# Patient Record
Sex: FEMALE | Race: BLACK OR AFRICAN AMERICAN | Employment: UNEMPLOYED | URBAN - METROPOLITAN AREA
[De-identification: names, ages, dates, MRNs, and addresses within clinical notes are randomized per-mention and may not be internally consistent; named-entity substitution may affect disease eponyms.]

---

## 2020-02-03 ENCOUNTER — HOSPITAL ENCOUNTER (EMERGENCY)
Facility: HOSPITAL | Age: 30
Discharge: HOME | End: 2020-02-03
Payer: MEDICAID

## 2020-02-03 VITALS
DIASTOLIC BLOOD PRESSURE: 88 MMHG | BODY MASS INDEX: 35.84 KG/M2 | TEMPERATURE: 100 F | HEIGHT: 66 IN | WEIGHT: 223 LBS | RESPIRATION RATE: 18 BRPM | OXYGEN SATURATION: 97 % | HEART RATE: 109 BPM | SYSTOLIC BLOOD PRESSURE: 133 MMHG

## 2020-02-03 DIAGNOSIS — J11.1 INFLUENZA: Primary | ICD-10-CM

## 2020-02-03 LAB
FLUAV + FLUBV RNA SPEC NAA+PROBE: NEGATIVE
FLUAV + FLUBV RNA SPEC NAA+PROBE: NEGATIVE
FLUAV + FLUBV RNA SPEC NAA+PROBE: POSITIVE
S PYO AG THROAT QL: NEGATIVE

## 2020-02-03 PROCEDURE — 99283 EMERGENCY DEPT VISIT LOW MDM: CPT

## 2020-02-03 PROCEDURE — 87430 STREP A AG IA: CPT

## 2020-02-03 PROCEDURE — 87631 RESP VIRUS 3-5 TARGETS: CPT

## 2020-02-03 RX ORDER — OSELTAMIVIR PHOSPHATE 75 MG/1
75 CAPSULE ORAL 2 TIMES DAILY
Qty: 10 CAPSULE | Refills: 0 | Status: SHIPPED | OUTPATIENT
Start: 2020-02-03 | End: 2020-02-03 | Stop reason: CLARIF

## 2020-02-03 RX ORDER — OSELTAMIVIR PHOSPHATE 75 MG/1
75 CAPSULE ORAL 2 TIMES DAILY
Qty: 10 CAPSULE | Refills: 0 | Status: SHIPPED | OUTPATIENT
Start: 2020-02-03 | End: 2020-02-08

## 2020-02-04 NOTE — ED INITIAL ASSESSMENT (HPI)
Pt ambulatory to triage with complaints of cough and congestion, fever, body aches,bilateral  ear pain that started Friday. Pt denies Significant PMH. Pt is A/O x 4, breathing is non labored. Mask applied.

## 2020-02-04 NOTE — ED PROVIDER NOTES
Patient Seen in: [facility] Emergency Department    History   Patient presents with:  Cough/URI    Stated Complaint: flu like symptoms    HPI  19-year-old female patient with no chronic medical history presents to ER accompanied by his family with bilateral  NOSE: nasal turbinates boggy  NECK: supple, no adenopathy, no thyromegaly  THROAT: pnd noted, post phaynx mildly injected, no tonsillar enlargement or exudates bilaterally uvula is midline.   LUNGS: no accessory use, increased upper airway sounds

## 2020-05-22 ENCOUNTER — HOSPITAL ENCOUNTER (EMERGENCY)
Facility: HOSPITAL | Age: 30
Discharge: HOME | End: 2020-05-22
Attending: EMERGENCY MEDICINE
Payer: MEDICAID

## 2020-05-22 VITALS
OXYGEN SATURATION: 98 % | SYSTOLIC BLOOD PRESSURE: 124 MMHG | HEART RATE: 83 BPM | DIASTOLIC BLOOD PRESSURE: 82 MMHG | WEIGHT: 229 LBS | TEMPERATURE: 99 F | HEIGHT: 66 IN | BODY MASS INDEX: 36.8 KG/M2 | RESPIRATION RATE: 20 BRPM

## 2020-05-22 DIAGNOSIS — Z13.9 ENCOUNTER FOR MEDICAL SCREENING EXAMINATION: Primary | ICD-10-CM

## 2020-05-22 PROCEDURE — 99283 EMERGENCY DEPT VISIT LOW MDM: CPT

## 2020-05-22 NOTE — ED INITIAL ASSESSMENT (HPI)
Pt wants to be tested for COVID. Her boyfriend was tested Positive for COVID yesterday. Pt denies any symptoms of COVID.

## 2020-05-22 NOTE — ED PROVIDER NOTES
Patient Seen in: [facility] Emergency Department      History   Patient presents with: Other    Stated Complaint: Wants to get tested.  BF positive     HPI    63-year-old female with no significant past medical history presents to the emergency de No sig cervical LAD   Neurological: Awake, alert. Normal reflexes. No cranial nerve deficit. Skin: Skin is warm and dry. No rash noted. No erythema.    Psychiatric:    ED Course     Labs Reviewed   RAPID SARS-COV-2 BY PCR - Normal                  MDM

## 2020-05-28 ENCOUNTER — APPOINTMENT (OUTPATIENT)
Dept: GENERAL RADIOLOGY | Facility: HOSPITAL | Age: 30
End: 2020-05-28
Attending: EMERGENCY MEDICINE
Payer: MEDICAID

## 2020-05-28 ENCOUNTER — HOSPITAL ENCOUNTER (EMERGENCY)
Facility: HOSPITAL | Age: 30
Discharge: HOME | End: 2020-05-28
Attending: EMERGENCY MEDICINE
Payer: MEDICAID

## 2020-05-28 VITALS
TEMPERATURE: 100 F | RESPIRATION RATE: 18 BRPM | BODY MASS INDEX: 36.96 KG/M2 | HEIGHT: 66 IN | OXYGEN SATURATION: 97 % | HEART RATE: 90 BPM | SYSTOLIC BLOOD PRESSURE: 116 MMHG | DIASTOLIC BLOOD PRESSURE: 50 MMHG | WEIGHT: 230 LBS

## 2020-05-28 DIAGNOSIS — U07.1 COVID-19 VIRUS INFECTION: Primary | ICD-10-CM

## 2020-05-28 PROCEDURE — 99284 EMERGENCY DEPT VISIT MOD MDM: CPT

## 2020-05-28 PROCEDURE — 71045 X-RAY EXAM CHEST 1 VIEW: CPT | Performed by: EMERGENCY MEDICINE

## 2020-05-28 RX ORDER — ONDANSETRON 4 MG/1
4 TABLET, FILM COATED ORAL ONCE
Status: DISCONTINUED | OUTPATIENT
Start: 2020-05-28 | End: 2020-05-28

## 2020-05-28 RX ORDER — ACETAMINOPHEN 500 MG
1000 TABLET ORAL ONCE
Status: COMPLETED | OUTPATIENT
Start: 2020-05-28 | End: 2020-05-28

## 2020-05-28 RX ORDER — ONDANSETRON 4 MG/1
4 TABLET, ORALLY DISINTEGRATING ORAL ONCE
Status: COMPLETED | OUTPATIENT
Start: 2020-05-28 | End: 2020-05-28

## 2020-05-28 NOTE — ED INITIAL ASSESSMENT (HPI)
Pt came in for body aches, dry cough, diarrhea, and SOB when lying down for 3-4 days. RR even and nonlabored at rest, speaking in full sentences, ambulatory with steady gait.

## 2020-05-28 NOTE — ED PROVIDER NOTES
Patient Seen in: [facility] Emergency Department      History   Patient presents with:  Pain  Dyspnea CLARISSE SOB    Stated Complaint:     HPI    77-year-old female presents the ER with complaint of shortness of breath, body aches, diarrhea for the pa Pulmonary effort is normal.      Breath sounds: Normal breath sounds. Abdominal:      General: Bowel sounds are normal.      Palpations: Abdomen is soft. Musculoskeletal: Normal range of motion. Skin:     General: Skin is warm.       Capillary Refill: patient.

## 2021-02-09 ENCOUNTER — OFFICE VISIT (OUTPATIENT)
Dept: OBGYN CLINIC | Facility: CLINIC | Age: 31
End: 2021-02-09
Payer: MEDICAID

## 2021-02-09 VITALS
DIASTOLIC BLOOD PRESSURE: 72 MMHG | BODY MASS INDEX: 35.68 KG/M2 | HEIGHT: 66 IN | WEIGHT: 222 LBS | SYSTOLIC BLOOD PRESSURE: 107 MMHG | HEART RATE: 91 BPM

## 2021-02-09 DIAGNOSIS — N92.6 MISSED MENSES: Primary | ICD-10-CM

## 2021-02-09 DIAGNOSIS — O09.891 RADIATION EXPOSURE AFFECTING PREGNANCY IN FIRST TRIMESTER: ICD-10-CM

## 2021-02-09 DIAGNOSIS — O99.210 OBESITY IN PREGNANCY: ICD-10-CM

## 2021-02-09 LAB
CONTROL LINE PRESENT WITH A CLEAR BACKGROUND (YES/NO): YES YES/NO
KIT LOT #: NORMAL NUMERIC
PREGNANCY TEST, URINE: POSITIVE

## 2021-02-09 PROCEDURE — 81025 URINE PREGNANCY TEST: CPT | Performed by: ADVANCED PRACTICE MIDWIFE

## 2021-02-09 PROCEDURE — 3074F SYST BP LT 130 MM HG: CPT | Performed by: ADVANCED PRACTICE MIDWIFE

## 2021-02-09 PROCEDURE — 99203 OFFICE O/P NEW LOW 30 MIN: CPT | Performed by: ADVANCED PRACTICE MIDWIFE

## 2021-02-09 PROCEDURE — 3078F DIAST BP <80 MM HG: CPT | Performed by: ADVANCED PRACTICE MIDWIFE

## 2021-02-09 PROCEDURE — 3008F BODY MASS INDEX DOCD: CPT | Performed by: ADVANCED PRACTICE MIDWIFE

## 2021-02-09 RX ORDER — GLYCERIN/MINERAL OIL
LOTION (ML) TOPICAL
COMMUNITY

## 2021-02-09 NOTE — PROGRESS NOTES
Sapna Ruiz is a 27year old female. HPI:   Patient presents with:  Gyn Exam: Missed menses        , hx  x 2. No complications.    Approximate LMP 2021, TANISHA 10/11/2021, now at 5 1/7 wks  Denies pain or bleeding. + nausea, No vomiting stools  Genito-Urinary ROS: no dysuria, trouble voiding, or hematuria  Neurological ROS: negative for - dizziness or headaches      PHYSICAL EXAM:      02/09/21  1655   BP: 107/72   Pulse: 91       Physical Exam   Constitutional: She is oriented to person, pregnancy and may recommend anti-coagulation if covid + in pregnancy. Notify CNM if test positive or any symptoms of Covid-19.   9. Declines flu vaccine today, will consider for next visit.     Pre- Eclampsia Risk Assessment:   High risk Factors- none  Mode

## 2021-02-17 ENCOUNTER — TELEPHONE (OUTPATIENT)
Dept: OBGYN CLINIC | Facility: CLINIC | Age: 31
End: 2021-02-17

## 2021-02-17 NOTE — TELEPHONE ENCOUNTER
Patient is calling about migraines she is having. She is having trouble keeping her eyes open due to the pain. Please advise.

## 2021-02-17 NOTE — TELEPHONE ENCOUNTER
Pt states she has had a migraine for the past 4 days. Pt states she has a hx of migraines before pregnancy and she would use Ibuprofen to help with pain. Pt states she has taken Tylenol and it has not helped.  Pt states yesterday she had blurry vision and s

## 2021-02-18 NOTE — TELEPHONE ENCOUNTER
Pt advised per MES she can take Tylenol extra strength with caffeine and if this does not resolve migraine she can come in for appt tomorrow. Pt asking if there is any additional vitamin she can take for energy.  Advised she will be screened for anemia with

## 2021-02-23 ENCOUNTER — HOSPITAL ENCOUNTER (OUTPATIENT)
Dept: ULTRASOUND IMAGING | Facility: HOSPITAL | Age: 31
Discharge: HOME | End: 2021-02-23
Attending: ADVANCED PRACTICE MIDWIFE
Payer: MEDICAID

## 2021-02-23 ENCOUNTER — TELEPHONE (OUTPATIENT)
Dept: OBGYN CLINIC | Facility: CLINIC | Age: 31
End: 2021-02-23

## 2021-02-23 DIAGNOSIS — N92.6 MISSED MENSES: ICD-10-CM

## 2021-02-23 PROCEDURE — 76802 OB US < 14 WKS ADDL FETUS: CPT | Performed by: ADVANCED PRACTICE MIDWIFE

## 2021-02-23 PROCEDURE — 76817 TRANSVAGINAL US OBSTETRIC: CPT | Performed by: ADVANCED PRACTICE MIDWIFE

## 2021-02-23 PROCEDURE — 76801 OB US < 14 WKS SINGLE FETUS: CPT | Performed by: ADVANCED PRACTICE MIDWIFE

## 2021-02-23 NOTE — TELEPHONE ENCOUNTER
Patient started prenatal care with the midwives. She is expecting twins. They are referring her to the [facility] group and suggested she scheduled a nurse education call. Please advise.

## 2021-02-23 NOTE — TELEPHONE ENCOUNTER
PC to patient, notified of findings of ultrasound with + twins and change in dating. Advised we do not care for twins and will refer to [facility]. Given contact info for [facility] and to call and schedule nurse ed visit. Agrees with plan.

## 2021-02-23 NOTE — TELEPHONE ENCOUNTER
Pt scheduled for education visit 2/24 at 1pm and New OB visit at 3 pm. Tomeka Jiang   MRN: [id number]    Department:  [facility] Emergency Department   Date of Visit:  12/28/2017           Disclosure     Insurance plans vary and the physician(s) referred by the ER may not be covered by your plan.  Please contac CARE PHYSICIAN AT ONCE OR RETURN IMMEDIATELY TO THE EMERGENCY DEPARTMENT. If you have been prescribed any medication(s), please fill your prescription right away and begin taking the medication(s) as directed.   If you believe that any of the medications

## 2021-02-24 ENCOUNTER — NURSE ONLY (OUTPATIENT)
Dept: OBGYN CLINIC | Facility: CLINIC | Age: 31
End: 2021-02-24

## 2021-02-24 ENCOUNTER — INITIAL PRENATAL (OUTPATIENT)
Dept: OBGYN CLINIC | Facility: CLINIC | Age: 31
End: 2021-02-24
Payer: MEDICAID

## 2021-02-24 VITALS — DIASTOLIC BLOOD PRESSURE: 66 MMHG | BODY MASS INDEX: 36 KG/M2 | SYSTOLIC BLOOD PRESSURE: 122 MMHG | WEIGHT: 224 LBS

## 2021-02-24 DIAGNOSIS — Z34.81 ENCOUNTER FOR SUPERVISION OF OTHER NORMAL PREGNANCY IN FIRST TRIMESTER: Primary | ICD-10-CM

## 2021-02-24 PROBLEM — O30.049 DICHORIONIC DIAMNIOTIC TWIN PREGNANCY, ANTEPARTUM (HCC): Status: ACTIVE | Noted: 2021-02-24

## 2021-02-24 PROBLEM — O30.049 DICHORIONIC DIAMNIOTIC TWIN PREGNANCY, ANTEPARTUM: Status: ACTIVE | Noted: 2021-02-24

## 2021-02-24 PROCEDURE — 3078F DIAST BP <80 MM HG: CPT | Performed by: OBSTETRICS & GYNECOLOGY

## 2021-02-24 PROCEDURE — 0500F INITIAL PRENATAL CARE VISIT: CPT | Performed by: OBSTETRICS & GYNECOLOGY

## 2021-02-24 PROCEDURE — 3074F SYST BP LT 130 MM HG: CPT | Performed by: OBSTETRICS & GYNECOLOGY

## 2021-02-24 NOTE — PROGRESS NOTES
Pt is a  with EDC of 10/06/2021 based on FT OB ultrasound on 21. LMP-2021      Med Hx- PT RECENTLY DX WITH HIGH RISK HPV.  PT VOICED SHE WAS HAVING A COLPOSCOPY DONE THROUGH A PROVIDER THAT IS NOT THROUGH [facility].    OB Hx-2 full term '

## 2021-02-25 LAB
C TRACH DNA SPEC QL NAA+PROBE: NEGATIVE
HPV I/H RISK 1 DNA SPEC QL NAA+PROBE: POSITIVE
N GONORRHOEA DNA SPEC QL NAA+PROBE: NEGATIVE

## 2021-03-02 ENCOUNTER — TELEPHONE (OUTPATIENT)
Dept: OBGYN CLINIC | Facility: CLINIC | Age: 31
End: 2021-03-02

## 2021-03-02 ENCOUNTER — LAB ENCOUNTER (OUTPATIENT)
Dept: LAB | Facility: HOSPITAL | Age: 31
End: 2021-03-02
Attending: OBSTETRICS & GYNECOLOGY
Payer: MEDICAID

## 2021-03-02 DIAGNOSIS — R73.09 ELEVATED GLUCOSE TOLERANCE TEST: Primary | ICD-10-CM

## 2021-03-02 DIAGNOSIS — Z34.81 ENCOUNTER FOR SUPERVISION OF OTHER NORMAL PREGNANCY IN FIRST TRIMESTER: ICD-10-CM

## 2021-03-02 LAB
ANTIBODY SCREEN: NEGATIVE
BASOPHILS # BLD AUTO: 0.03 X10(3) UL (ref 0–0.2)
BASOPHILS NFR BLD AUTO: 0.4 %
DEPRECATED RDW RBC AUTO: 37.1 FL (ref 35.1–46.3)
EOSINOPHIL # BLD AUTO: 0.11 X10(3) UL (ref 0–0.7)
EOSINOPHIL NFR BLD AUTO: 1.5 %
ERYTHROCYTE [DISTWIDTH] IN BLOOD BY AUTOMATED COUNT: 12.8 % (ref 11–15)
GLUCOSE 1H P GLC SERPL-MCNC: 132 MG/DL
HBV SURFACE AG SER-ACNC: <0.1 [IU]/L
HBV SURFACE AG SERPL QL IA: NONREACTIVE
HCT VFR BLD AUTO: 34.7 %
HCV AB SERPL QL IA: NONREACTIVE
HGB BLD-MCNC: 11.5 G/DL
HPV16 DNA CVX QL PROBE+SIG AMP: NEGATIVE
HPV18 DNA CVX QL PROBE+SIG AMP: NEGATIVE
IMM GRANULOCYTES # BLD AUTO: 0.03 X10(3) UL (ref 0–1)
IMM GRANULOCYTES NFR BLD: 0.4 %
LYMPHOCYTES # BLD AUTO: 1.59 X10(3) UL (ref 1–4)
LYMPHOCYTES NFR BLD AUTO: 22.4 %
MCH RBC QN AUTO: 26.6 PG (ref 26–34)
MCHC RBC AUTO-ENTMCNC: 33.1 G/DL (ref 31–37)
MCV RBC AUTO: 80.3 FL
MONOCYTES # BLD AUTO: 0.45 X10(3) UL (ref 0.1–1)
MONOCYTES NFR BLD AUTO: 6.3 %
NEUTROPHILS # BLD AUTO: 4.89 X10 (3) UL (ref 1.5–7.7)
NEUTROPHILS # BLD AUTO: 4.89 X10(3) UL (ref 1.5–7.7)
NEUTROPHILS NFR BLD AUTO: 69 %
PLATELET # BLD AUTO: 270 10(3)UL (ref 150–450)
RBC # BLD AUTO: 4.32 X10(6)UL
RH BLOOD TYPE: POSITIVE
RUBV IGG SER QL: POSITIVE
RUBV IGG SER-ACNC: 15.5 IU/ML (ref 10–?)
WBC # BLD AUTO: 7.1 X10(3) UL (ref 4–11)

## 2021-03-02 PROCEDURE — 86900 BLOOD TYPING SEROLOGIC ABO: CPT

## 2021-03-02 PROCEDURE — 86780 TREPONEMA PALLIDUM: CPT

## 2021-03-02 PROCEDURE — 85025 COMPLETE CBC W/AUTO DIFF WBC: CPT

## 2021-03-02 PROCEDURE — 86762 RUBELLA ANTIBODY: CPT

## 2021-03-02 PROCEDURE — 86803 HEPATITIS C AB TEST: CPT

## 2021-03-02 PROCEDURE — 82950 GLUCOSE TEST: CPT

## 2021-03-02 PROCEDURE — 87340 HEPATITIS B SURFACE AG IA: CPT

## 2021-03-02 PROCEDURE — 87389 HIV-1 AG W/HIV-1&-2 AB AG IA: CPT

## 2021-03-02 PROCEDURE — 86850 RBC ANTIBODY SCREEN: CPT

## 2021-03-02 PROCEDURE — 87086 URINE CULTURE/COLONY COUNT: CPT

## 2021-03-02 PROCEDURE — 36415 COLL VENOUS BLD VENIPUNCTURE: CPT

## 2021-03-02 PROCEDURE — 86901 BLOOD TYPING SEROLOGIC RH(D): CPT

## 2021-03-02 NOTE — TELEPHONE ENCOUNTER
Pt called and informed of results and recommendations. Pt voices understanding. Order for 3 hour glucose placed and pt entered into follow up book. ----- Message from Kate Pierre MD sent at 3/2/2021  9:37 AM CST -----  Abnormal 1 hr GTT.   Send f

## 2021-03-03 LAB — T PALLIDUM AB SER QL: NEGATIVE

## 2021-03-05 ENCOUNTER — LABORATORY ENCOUNTER (OUTPATIENT)
Dept: LAB | Facility: HOSPITAL | Age: 31
End: 2021-03-05
Attending: OBSTETRICS & GYNECOLOGY
Payer: MEDICAID

## 2021-03-05 DIAGNOSIS — R73.09 ELEVATED GLUCOSE TOLERANCE TEST: ICD-10-CM

## 2021-03-05 LAB
1 HR GLUCOSE GESTATIONAL: 145 MG/DL
GLUCOSE 1H P GLC SERPL-MCNC: 126 MG/DL
GLUCOSE 3H P GLC SERPL-MCNC: 102 MG/DL
GLUCOSE P FAST SERPL-MCNC: 93 MG/DL

## 2021-03-05 PROCEDURE — 82952 GTT-ADDED SAMPLES: CPT

## 2021-03-05 PROCEDURE — 82951 GLUCOSE TOLERANCE TEST (GTT): CPT

## 2021-03-05 PROCEDURE — 36415 COLL VENOUS BLD VENIPUNCTURE: CPT

## 2021-03-25 ENCOUNTER — ROUTINE PRENATAL (OUTPATIENT)
Dept: OBGYN CLINIC | Facility: CLINIC | Age: 31
End: 2021-03-25
Payer: MEDICAID

## 2021-03-25 VITALS — BODY MASS INDEX: 37 KG/M2 | WEIGHT: 227 LBS | SYSTOLIC BLOOD PRESSURE: 119 MMHG | DIASTOLIC BLOOD PRESSURE: 77 MMHG

## 2021-03-25 DIAGNOSIS — O30.001 TWIN GESTATION IN FIRST TRIMESTER, UNSPECIFIED MULTIPLE GESTATION TYPE: ICD-10-CM

## 2021-03-25 DIAGNOSIS — Z34.81 ENCOUNTER FOR SUPERVISION OF OTHER NORMAL PREGNANCY IN FIRST TRIMESTER: Primary | ICD-10-CM

## 2021-03-25 DIAGNOSIS — O30.049 DICHORIONIC DIAMNIOTIC TWIN PREGNANCY, ANTEPARTUM: ICD-10-CM

## 2021-03-25 PROBLEM — O99.210 OBESITY AFFECTING PREGNANCY, ANTEPARTUM: Status: ACTIVE | Noted: 2021-02-09

## 2021-03-25 PROBLEM — O99.210 OBESITY AFFECTING PREGNANCY, ANTEPARTUM (HCC): Status: ACTIVE | Noted: 2021-02-09

## 2021-03-25 PROBLEM — G43.009 MIGRAINE WITHOUT AURA AND WITHOUT STATUS MIGRAINOSUS, NOT INTRACTABLE: Status: ACTIVE | Noted: 2021-03-25

## 2021-03-25 LAB
MULTISTIX LOT#: 5077 NUMERIC
PH, URINE: 6 (ref 4.5–8)
SPECIFIC GRAVITY: 1.02 (ref 1–1.03)
UROBILINOGEN,SEMI-QN: 0.2 MG/DL (ref 0–1.9)

## 2021-03-25 PROCEDURE — 3074F SYST BP LT 130 MM HG: CPT | Performed by: OBSTETRICS & GYNECOLOGY

## 2021-03-25 PROCEDURE — 81002 URINALYSIS NONAUTO W/O SCOPE: CPT | Performed by: OBSTETRICS & GYNECOLOGY

## 2021-03-25 PROCEDURE — 0502F SUBSEQUENT PRENATAL CARE: CPT | Performed by: OBSTETRICS & GYNECOLOGY

## 2021-03-25 PROCEDURE — 3078F DIAST BP <80 MM HG: CPT | Performed by: OBSTETRICS & GYNECOLOGY

## 2021-03-25 NOTE — PROGRESS NOTES
Complains of bad headaches migraine type similar to what she has had in the past but more frequent. Has been taking one Tylenol which she thinks is extra strength with some relief but not complete.   Counseled on how to manage and to notify if unrelieved o

## 2021-03-29 ENCOUNTER — TELEPHONE (OUTPATIENT)
Dept: PERINATAL CARE | Facility: HOSPITAL | Age: 31
End: 2021-03-29

## 2021-04-22 ENCOUNTER — ROUTINE PRENATAL (OUTPATIENT)
Dept: OBGYN CLINIC | Facility: CLINIC | Age: 31
End: 2021-04-22
Payer: MEDICAID

## 2021-04-22 VITALS — BODY MASS INDEX: 36 KG/M2 | WEIGHT: 225 LBS | SYSTOLIC BLOOD PRESSURE: 120 MMHG | DIASTOLIC BLOOD PRESSURE: 70 MMHG

## 2021-04-22 DIAGNOSIS — Z34.82 ENCOUNTER FOR SUPERVISION OF OTHER NORMAL PREGNANCY IN SECOND TRIMESTER: Primary | ICD-10-CM

## 2021-04-22 DIAGNOSIS — R31.9 HEMATURIA, UNSPECIFIED TYPE: ICD-10-CM

## 2021-04-22 PROCEDURE — 3074F SYST BP LT 130 MM HG: CPT | Performed by: OBSTETRICS & GYNECOLOGY

## 2021-04-22 PROCEDURE — 3078F DIAST BP <80 MM HG: CPT | Performed by: OBSTETRICS & GYNECOLOGY

## 2021-04-22 PROCEDURE — 0502F SUBSEQUENT PRENATAL CARE: CPT | Performed by: OBSTETRICS & GYNECOLOGY

## 2021-04-22 PROCEDURE — 81002 URINALYSIS NONAUTO W/O SCOPE: CPT | Performed by: OBSTETRICS & GYNECOLOGY

## 2021-04-22 NOTE — TELEPHONE ENCOUNTER
Primary Diagnosis Code: O30.001 Description: Twin pregnancy, unspecified number of placenta and unspecified number of amniotic sacs, first trimester  Secondary Diagnosis Code:  Description:   Date of Service: Not provided    CPT Code: 42764 Description: OB

## 2021-04-22 NOTE — PROGRESS NOTES
Twins, pregnancy counseling. Pt has occas mild round ligament discomfort with motion. Has mfm ultrasound 20 weeks. All questions answered.

## 2021-04-24 NOTE — TELEPHONE ENCOUNTER
Authorization Number: [id number]  Case Number: [id number]     Status: Approved   P2P Status:   Approval Date: 4/23/2021 12:00:00 AM  Service Code: [id number]  Service Description: OB , [address], [address]  Site Name: [address]

## 2021-04-26 ENCOUNTER — HOSPITAL ENCOUNTER (EMERGENCY)
Facility: HOSPITAL | Age: 31
Discharge: HOME | End: 2021-04-26
Payer: MEDICAID

## 2021-04-26 ENCOUNTER — APPOINTMENT (OUTPATIENT)
Dept: ULTRASOUND IMAGING | Facility: HOSPITAL | Age: 31
End: 2021-04-26
Payer: MEDICAID

## 2021-04-26 VITALS
RESPIRATION RATE: 18 BRPM | DIASTOLIC BLOOD PRESSURE: 62 MMHG | BODY MASS INDEX: 36.64 KG/M2 | HEART RATE: 95 BPM | TEMPERATURE: 98 F | OXYGEN SATURATION: 98 % | SYSTOLIC BLOOD PRESSURE: 111 MMHG | HEIGHT: 66 IN | WEIGHT: 228 LBS

## 2021-04-26 DIAGNOSIS — R10.9 ABDOMINAL PAIN AFFECTING PREGNANCY: ICD-10-CM

## 2021-04-26 DIAGNOSIS — O26.899 ABDOMINAL PAIN AFFECTING PREGNANCY: ICD-10-CM

## 2021-04-26 DIAGNOSIS — O23.42 URINARY TRACT INFECTION IN MOTHER DURING SECOND TRIMESTER OF PREGNANCY: Primary | ICD-10-CM

## 2021-04-26 PROCEDURE — 99284 EMERGENCY DEPT VISIT MOD MDM: CPT

## 2021-04-26 PROCEDURE — 80048 BASIC METABOLIC PNL TOTAL CA: CPT

## 2021-04-26 PROCEDURE — 87086 URINE CULTURE/COLONY COUNT: CPT

## 2021-04-26 PROCEDURE — 36415 COLL VENOUS BLD VENIPUNCTURE: CPT

## 2021-04-26 PROCEDURE — 87077 CULTURE AEROBIC IDENTIFY: CPT

## 2021-04-26 PROCEDURE — 81001 URINALYSIS AUTO W/SCOPE: CPT

## 2021-04-26 PROCEDURE — 76815 OB US LIMITED FETUS(S): CPT

## 2021-04-26 PROCEDURE — 85025 COMPLETE CBC W/AUTO DIFF WBC: CPT

## 2021-04-26 RX ORDER — NITROFURANTOIN 25; 75 MG/1; MG/1
100 CAPSULE ORAL 2 TIMES DAILY
Qty: 20 CAPSULE | Refills: 0 | Status: SHIPPED | OUTPATIENT
Start: 2021-04-26 | End: 2021-05-03

## 2021-04-27 NOTE — ED INITIAL ASSESSMENT (HPI)
Pt is 16 weeks pregnant G4, came in for lower abdominal pain and pressure that has been going on since this morning. Pt denies vaginal bleeding.  + nausea, denies vomiting.

## 2021-04-27 NOTE — ED QUICK NOTES
Pt to ED by self with c/o lower abdominal pressure that started today. Pt with + twin pregnancy. Pt has been seen for prenatal care with Dr Louisa Hutchins. Pt denies dysuria or hematuria. Pt denies vaginal bleeding. Pt states normal bowel movements.  Pt denies fall

## 2021-04-27 NOTE — ED PROVIDER NOTES
Patient Seen in: [facility] AND [facility] Emergency Department      History   Patient presents with:  Pregnancy Issues    Stated Complaint: 16 wks preg, abd pain    HPI/Subjective:   31yo/f with hx of obesity, ovarian cyst, [id number] reports to the ED w complain equal, round, and reactive to light. Cardiovascular:      Rate and Rhythm: Normal rate and regular rhythm. Heart sounds: Normal heart sounds. Pulmonary:      Effort: Pulmonary effort is normal.      Breath sounds: Normal breath sounds.    Abdominal Abnormal            Final result                 Please view results for these tests on the individual orders.    RAINBOW DRAW BLUE   RAINBOW DRAW LAVENDER   RAINBOW DRAW LIGHT GREEN   RAINBOW DRAW GOLD   URINE CULTURE, ROUTINE           Additional Informat

## 2021-05-19 ENCOUNTER — HOSPITAL ENCOUNTER (OUTPATIENT)
Dept: PERINATAL CARE | Facility: HOSPITAL | Age: 31
Discharge: HOME | End: 2021-05-19
Attending: OBSTETRICS & GYNECOLOGY
Payer: MEDICAID

## 2021-05-19 ENCOUNTER — TELEPHONE (OUTPATIENT)
Dept: PERINATAL CARE | Facility: HOSPITAL | Age: 31
End: 2021-05-19

## 2021-05-19 ENCOUNTER — ROUTINE PRENATAL (OUTPATIENT)
Dept: OBGYN CLINIC | Facility: CLINIC | Age: 31
End: 2021-05-19
Payer: MEDICAID

## 2021-05-19 VITALS — BODY MASS INDEX: 37 KG/M2 | SYSTOLIC BLOOD PRESSURE: 110 MMHG | WEIGHT: 228 LBS | DIASTOLIC BLOOD PRESSURE: 62 MMHG

## 2021-05-19 VITALS
HEART RATE: 104 BPM | WEIGHT: 229 LBS | SYSTOLIC BLOOD PRESSURE: 119 MMHG | BODY MASS INDEX: 37 KG/M2 | DIASTOLIC BLOOD PRESSURE: 70 MMHG

## 2021-05-19 DIAGNOSIS — Z34.92 NORMAL PREGNANCY IN SECOND TRIMESTER: Primary | ICD-10-CM

## 2021-05-19 DIAGNOSIS — O30.049 DICHORIONIC DIAMNIOTIC TWIN PREGNANCY, ANTEPARTUM: Primary | ICD-10-CM

## 2021-05-19 DIAGNOSIS — O99.210 OBESITY AFFECTING PREGNANCY, ANTEPARTUM: ICD-10-CM

## 2021-05-19 DIAGNOSIS — O30.049 DICHORIONIC DIAMNIOTIC TWIN PREGNANCY, ANTEPARTUM: ICD-10-CM

## 2021-05-19 DIAGNOSIS — O30.042 DICHORIONIC DIAMNIOTIC TWIN PREGNANCY IN SECOND TRIMESTER: Primary | ICD-10-CM

## 2021-05-19 PROCEDURE — 3074F SYST BP LT 130 MM HG: CPT | Performed by: OBSTETRICS & GYNECOLOGY

## 2021-05-19 PROCEDURE — 3078F DIAST BP <80 MM HG: CPT | Performed by: OBSTETRICS & GYNECOLOGY

## 2021-05-19 PROCEDURE — 99205 OFFICE O/P NEW HI 60 MIN: CPT | Performed by: OBSTETRICS & GYNECOLOGY

## 2021-05-19 PROCEDURE — 76812 OB US DETAILED ADDL FETUS: CPT | Performed by: OBSTETRICS & GYNECOLOGY

## 2021-05-19 PROCEDURE — 76811 OB US DETAILED SNGL FETUS: CPT | Performed by: OBSTETRICS & GYNECOLOGY

## 2021-05-19 PROCEDURE — 81002 URINALYSIS NONAUTO W/O SCOPE: CPT | Performed by: OBSTETRICS & GYNECOLOGY

## 2021-05-19 PROCEDURE — 0502F SUBSEQUENT PRENATAL CARE: CPT | Performed by: OBSTETRICS & GYNECOLOGY

## 2021-05-19 NOTE — PROGRESS NOTES
Reason for Consult:   Dear Sheila Pérez ,    Thank you for requesting ultrasound evaluation and maternal fetal medicine consultation on Dilcia Torres. As you are aware she is a 27year old female with a Twin pregnancy at 24w0d.   A maternal-fetal me Smokeless tobacco: Never Used    Vaping Use      Vaping Use: Never used    Alcohol use: Never    Drug use: Never       NARRATIVE:     /70   Pulse 104   Wt 229 lb (103.9 kg)   LMP 01/07/2021   BMI 36.96 kg/m²           Alert and Oriented.   No acute di risk for developing complications that result from anatomic or functional immaturity.  The complications seen most frequently are hypothermia, respiratory abnormalities, patent ductus arteriosus, intracranial hemorrhage, hypoglycemia, necrotizing enterocoli evidence of structural abnormalities are seen today. The nasal bone is present. No ultrasound evidence of markers for aneuploidy are seen. She understands that ultrasound exam cannot exclude genetic abnormalities and that genetic testing is recommended.  Roberto Black

## 2021-05-19 NOTE — PROGRESS NOTES
C/o heartburn and intest cramps at times. Beginning to feel both babies move. Had level 2 ultrasound today wnl. Has not decided about any genetic screening. Rec iron po daily. Plan 3 hr gtt at 28 wks instead of Glucola as she needed one already.

## 2021-05-24 NOTE — TELEPHONE ENCOUNTER
IMPRESSION:   1. Twin IUP at 20w0d  2. Scan consistent with dates   3. No ultrasound evidence of fetal structural abnormalities    4. Diamniotic, dichorionic placentation   5. Concordant growth             RECOMMENDATIONS:   1.  Weekly NST at 32 wks

## 2021-05-25 ENCOUNTER — LAB ENCOUNTER (OUTPATIENT)
Dept: LAB | Facility: HOSPITAL | Age: 31
End: 2021-05-25
Attending: OBSTETRICS & GYNECOLOGY
Payer: MEDICAID

## 2021-05-25 ENCOUNTER — PATIENT MESSAGE (OUTPATIENT)
Dept: OBGYN CLINIC | Facility: CLINIC | Age: 31
End: 2021-05-25

## 2021-05-25 DIAGNOSIS — R31.9 HEMATURIA, UNSPECIFIED TYPE: ICD-10-CM

## 2021-05-25 PROCEDURE — 87086 URINE CULTURE/COLONY COUNT: CPT

## 2021-05-26 ENCOUNTER — HOSPITAL ENCOUNTER (OUTPATIENT)
Facility: HOSPITAL | Age: 31
Setting detail: OBSERVATION
Discharge: HOME | End: 2021-05-26
Attending: OBSTETRICS & GYNECOLOGY | Admitting: OBSTETRICS & GYNECOLOGY
Payer: MEDICAID

## 2021-05-26 ENCOUNTER — ROUTINE PRENATAL (OUTPATIENT)
Dept: OBGYN CLINIC | Facility: CLINIC | Age: 31
End: 2021-05-26
Payer: MEDICAID

## 2021-05-26 ENCOUNTER — HOSPITAL ENCOUNTER (OUTPATIENT)
Dept: PERINATAL CARE | Facility: HOSPITAL | Age: 31
Setting detail: OBSERVATION
Discharge: HOME | End: 2021-05-26
Attending: OBSTETRICS & GYNECOLOGY
Payer: MEDICAID

## 2021-05-26 VITALS — SYSTOLIC BLOOD PRESSURE: 106 MMHG | DIASTOLIC BLOOD PRESSURE: 61 MMHG | HEART RATE: 117 BPM

## 2021-05-26 VITALS — DIASTOLIC BLOOD PRESSURE: 60 MMHG | BODY MASS INDEX: 37 KG/M2 | WEIGHT: 226.38 LBS | SYSTOLIC BLOOD PRESSURE: 110 MMHG

## 2021-05-26 DIAGNOSIS — Z34.82 ENCOUNTER FOR SUPERVISION OF OTHER NORMAL PREGNANCY IN SECOND TRIMESTER: Primary | ICD-10-CM

## 2021-05-26 DIAGNOSIS — O99.210 OBESITY AFFECTING PREGNANCY, ANTEPARTUM: ICD-10-CM

## 2021-05-26 DIAGNOSIS — O30.049 DICHORIONIC DIAMNIOTIC TWIN PREGNANCY, ANTEPARTUM: Primary | ICD-10-CM

## 2021-05-26 DIAGNOSIS — O26.892 ABDOMINAL PAIN DURING PREGNANCY IN SECOND TRIMESTER: ICD-10-CM

## 2021-05-26 DIAGNOSIS — R10.9 ABDOMINAL PAIN DURING PREGNANCY IN SECOND TRIMESTER: ICD-10-CM

## 2021-05-26 DIAGNOSIS — R10.9 ABDOMINAL PAIN AFFECTING PREGNANCY: ICD-10-CM

## 2021-05-26 DIAGNOSIS — O26.899 ABDOMINAL PAIN AFFECTING PREGNANCY: ICD-10-CM

## 2021-05-26 PROBLEM — Z34.90 PREGNANCY (HCC): Status: ACTIVE | Noted: 2021-05-26

## 2021-05-26 PROBLEM — Z34.90 PREGNANCY: Status: ACTIVE | Noted: 2021-05-26

## 2021-05-26 PROCEDURE — 3074F SYST BP LT 130 MM HG: CPT | Performed by: NURSE PRACTITIONER

## 2021-05-26 PROCEDURE — 3078F DIAST BP <80 MM HG: CPT | Performed by: NURSE PRACTITIONER

## 2021-05-26 PROCEDURE — 99219 INITIAL OBSERVATION CARE,LEVL II: CPT | Performed by: OBSTETRICS & GYNECOLOGY

## 2021-05-26 PROCEDURE — 76815 OB US LIMITED FETUS(S): CPT | Performed by: OBSTETRICS & GYNECOLOGY

## 2021-05-26 PROCEDURE — 59025 FETAL NON-STRESS TEST: CPT | Performed by: OBSTETRICS & GYNECOLOGY

## 2021-05-26 PROCEDURE — 0502F SUBSEQUENT PRENATAL CARE: CPT | Performed by: NURSE PRACTITIONER

## 2021-05-26 NOTE — TELEPHONE ENCOUNTER
OB History     T2    L2    SAB0  TAB2  Ectopic0  Multiple0  Live Births2     Pt Name and  verified. Pt states that she has a \"knot\" around abdominal area. States that she gets sharp pains around the are of the knot.   She is concerned ab

## 2021-05-26 NOTE — PROGRESS NOTES
Pt is a 27year old female admitted to TR5/TR5-A. Patient presents with:   Assessment     Pt is [id number] 21w0d intra-uterine pregnancy. History obtained, consents signed. Oriented to room, staff, and plan of care.

## 2021-05-26 NOTE — PROGRESS NOTES
Patient returned from [facility] department in stable condition via wheelchair.

## 2021-05-26 NOTE — TELEPHONE ENCOUNTER
Patient calling to see if message was received. In a lot of pain where the lump is.     Please contact asap

## 2021-05-26 NOTE — TRIAGE
[facility]      Triage Note    Constance Caldwell Patient Status:  Observation    1990 MRN [id number]   Location [address]  Attending Ella Patel MD   Hosp Day # 0 PCP None Pcp     Lesley Dunaway Para: K8P3 following MFM. Pt discharged. Reviewed PTL precautions, kick counts, and pre-E precautions. Pt states she has been straining with BM's. Reviewed hydration and stool softners. Pt verbalizes understanding of when to call doctors.  Leaves unit in stable condit

## 2021-05-26 NOTE — PROGRESS NOTES
Pt presents with complaints of feeling a \"tight knot\" in the middle of her abdomen that began at 7 pm last night. Pt states that intermittently the knot will become visible and protrude out of her stomach.  Even when the knot is not visible, she feels a

## 2021-05-26 NOTE — PROGRESS NOTES
[facility]    Labor Progress Note    Eva Gill Patient Status:  Observation    1990 MRN [id number]   Location [address]  Attending Sonido Fagan MD   Hosp Day # 0 PCP None Pcp       Subjective

## 2021-05-26 NOTE — CONSULTS
[address] Patient Status:  Observation    1990 MRN [id number]   Location [facility] Attending Gagandeep Iniguez MD   Hosp Day # 0 PCP None Pcp     SUBJECTIVE:  Reason fo edema  Neurologic: Grossly normal      Diagnostics:    OB ULTRASOUND REPORT   See imaging tab for complete ultrasound report or in PACS    Twin A -   IUP in cephalic right presentation. Placenta is posterior.    Cardiac activity is present, 142 bpm   MVP

## 2021-05-26 NOTE — TELEPHONE ENCOUNTER
We can't know what it is without an exam.  High risk pt, twins. Should make appt to be checked today.

## 2021-05-26 NOTE — H&P
[place] [place] Triage Observation History & Physical    Sapna Ruiz Patient Status:  Observation    1990 MRN [id number]   Location [address] [facility] Attending Teresita Ghosh MD   Hosp Day # 0 PCP Non appears stated age and cooperative. Comfortable  Respiratory: clear to auscultation bilaterally  Cardiac: regular rate and rhythm, S1, S2 normal, no murmur, click, rub or gallop  Abdomen: soft. nonfocal tenderness, mild.   Presentation: uncertain  Fetal h

## 2021-05-27 NOTE — TELEPHONE ENCOUNTER
Authorization Number: [id number]  Case Number: [id number]     Status: Approved  P2P Status:   Approval Date: 5/27/2021 10:06:32 AM  Service Code: [id number]  Service Description: Ob us, follow-up, per fetus  Site Name: [address] [facility]

## 2021-06-19 ENCOUNTER — ROUTINE PRENATAL (OUTPATIENT)
Dept: OBGYN CLINIC | Facility: CLINIC | Age: 31
End: 2021-06-19
Payer: MEDICAID

## 2021-06-19 VITALS — DIASTOLIC BLOOD PRESSURE: 70 MMHG | SYSTOLIC BLOOD PRESSURE: 122 MMHG | WEIGHT: 226 LBS | BODY MASS INDEX: 36 KG/M2

## 2021-06-19 DIAGNOSIS — Z34.82 ENCOUNTER FOR SUPERVISION OF OTHER NORMAL PREGNANCY IN SECOND TRIMESTER: Primary | ICD-10-CM

## 2021-06-19 PROBLEM — O99.019 ANTEPARTUM ANEMIA (HCC): Status: ACTIVE | Noted: 2021-06-19

## 2021-06-19 PROBLEM — O99.019 ANTEPARTUM ANEMIA: Status: ACTIVE | Noted: 2021-06-19

## 2021-06-19 PROCEDURE — 0502F SUBSEQUENT PRENATAL CARE: CPT | Performed by: OBSTETRICS & GYNECOLOGY

## 2021-06-19 PROCEDURE — 3074F SYST BP LT 130 MM HG: CPT | Performed by: OBSTETRICS & GYNECOLOGY

## 2021-06-19 PROCEDURE — 3078F DIAST BP <80 MM HG: CPT | Performed by: OBSTETRICS & GYNECOLOGY

## 2021-06-19 NOTE — PROGRESS NOTES
[facility], [facility]  Obstetrics and Gynecology  Prenatal Visit  Collin Rodríguez MD    HPI   Katey Romeo is a 27year old.o. [id number] 24w3d weeks. Here for routine prenatal visit and is without complaints.   Patient denies any regular uterine contractions,

## 2021-06-30 ENCOUNTER — HOSPITAL ENCOUNTER (OUTPATIENT)
Dept: PERINATAL CARE | Facility: HOSPITAL | Age: 31
Discharge: HOME | End: 2021-06-30
Attending: OBSTETRICS & GYNECOLOGY
Payer: MEDICAID

## 2021-06-30 VITALS
DIASTOLIC BLOOD PRESSURE: 63 MMHG | BODY MASS INDEX: 36 KG/M2 | HEART RATE: 118 BPM | WEIGHT: 226 LBS | SYSTOLIC BLOOD PRESSURE: 116 MMHG

## 2021-06-30 DIAGNOSIS — O99.210 OBESITY AFFECTING PREGNANCY, ANTEPARTUM: ICD-10-CM

## 2021-06-30 DIAGNOSIS — O30.049 DICHORIONIC DIAMNIOTIC TWIN PREGNANCY, ANTEPARTUM: Primary | ICD-10-CM

## 2021-06-30 DIAGNOSIS — O09.891 RADIATION EXPOSURE AFFECTING PREGNANCY IN FIRST TRIMESTER: ICD-10-CM

## 2021-06-30 DIAGNOSIS — O30.049 DICHORIONIC DIAMNIOTIC TWIN PREGNANCY, ANTEPARTUM: ICD-10-CM

## 2021-06-30 PROCEDURE — 76816 OB US FOLLOW-UP PER FETUS: CPT | Performed by: OBSTETRICS & GYNECOLOGY

## 2021-06-30 PROCEDURE — 99214 OFFICE O/P EST MOD 30 MIN: CPT | Performed by: OBSTETRICS & GYNECOLOGY

## 2021-06-30 NOTE — PROGRESS NOTES
Reason for Consult:   Dear Megha Zamora ,    Thank you for requesting ultrasound evaluation and maternal fetal medicine consultation on Yassine Fuchs. As you are aware she is a 27year old female with a Twin pregnancy.   A maternal-fetal medicine co tobacco: Never Used    Vaping Use      Vaping Use: Never used    Alcohol use: Never    Drug use: Never       NARRATIVE:     /63   Pulse 118   Wt 226 lb (102.5 kg)   LMP 01/07/2021   BMI 36.48 kg/m²           Alert and Oriented.   No acute distress

## 2021-07-19 ENCOUNTER — ROUTINE PRENATAL (OUTPATIENT)
Dept: OBGYN CLINIC | Facility: CLINIC | Age: 31
End: 2021-07-19
Payer: MEDICAID

## 2021-07-19 VITALS
BODY MASS INDEX: 36 KG/M2 | WEIGHT: 225 LBS | HEART RATE: 105 BPM | SYSTOLIC BLOOD PRESSURE: 104 MMHG | DIASTOLIC BLOOD PRESSURE: 67 MMHG

## 2021-07-19 DIAGNOSIS — Z34.83 ENCOUNTER FOR SUPERVISION OF OTHER NORMAL PREGNANCY IN THIRD TRIMESTER: Primary | ICD-10-CM

## 2021-07-19 LAB
APPEARANCE: CLEAR
BILIRUBIN: NEGATIVE
GLUCOSE (URINE DIPSTICK): NEGATIVE MG/DL
KETONES (URINE DIPSTICK): NEGATIVE MG/DL
LEUKOCYTES: NEGATIVE
MULTISTIX LOT#: NORMAL NUMERIC
NITRITE, URINE: NEGATIVE
OCCULT BLOOD: NEGATIVE
PH, URINE: 7.5 (ref 4.5–8)
PROTEIN (URINE DIPSTICK): NEGATIVE MG/DL
SPECIFIC GRAVITY: 1.02 (ref 1–1.03)
URINE-COLOR: YELLOW
UROBILINOGEN,SEMI-QN: 0.2 MG/DL (ref 0–1.9)

## 2021-07-19 PROCEDURE — 3078F DIAST BP <80 MM HG: CPT | Performed by: NURSE PRACTITIONER

## 2021-07-19 PROCEDURE — 81002 URINALYSIS NONAUTO W/O SCOPE: CPT | Performed by: NURSE PRACTITIONER

## 2021-07-19 PROCEDURE — 3074F SYST BP LT 130 MM HG: CPT | Performed by: NURSE PRACTITIONER

## 2021-07-19 PROCEDURE — 0502F SUBSEQUENT PRENATAL CARE: CPT | Performed by: NURSE PRACTITIONER

## 2021-07-19 RX ORDER — BREAST PUMP
EACH MISCELLANEOUS
Qty: 1 EACH | Refills: 0 | Status: SHIPPED | OUTPATIENT
Start: 2021-07-19

## 2021-07-19 NOTE — PROGRESS NOTES
[facility], [facility]  Obstetrics and Gynecology  28 Week Prenatal Visit  Sarai Brand, MSN, APRN, FNP-BC      HPI   Dilcia Torres is a 27year old. [id number] 28w5d weeks. TANISHA 10/6/21    Doing well.   Considering surgical sterilization if she needed a C/S. 1 lb needs to do. Reviewed with patient. - Patient counseled on 35 week labs and GBS between 36-38 weeks. - Blood type A+. Rhogam NA.  - Discussed kick counts - at least ten movements in one hour.  - Third trimester warning signs and PTL/labor reviewed.   -

## 2021-07-28 ENCOUNTER — HOSPITAL ENCOUNTER (OUTPATIENT)
Dept: PERINATAL CARE | Facility: HOSPITAL | Age: 31
Discharge: HOME | End: 2021-07-28
Attending: OBSTETRICS & GYNECOLOGY
Payer: MEDICAID

## 2021-07-28 VITALS
DIASTOLIC BLOOD PRESSURE: 73 MMHG | HEART RATE: 112 BPM | WEIGHT: 227 LBS | BODY MASS INDEX: 37 KG/M2 | SYSTOLIC BLOOD PRESSURE: 116 MMHG

## 2021-07-28 DIAGNOSIS — O30.049 DICHORIONIC DIAMNIOTIC TWIN PREGNANCY, ANTEPARTUM: ICD-10-CM

## 2021-07-28 DIAGNOSIS — O30.049 DICHORIONIC DIAMNIOTIC TWIN PREGNANCY, ANTEPARTUM: Primary | ICD-10-CM

## 2021-07-28 DIAGNOSIS — O99.210 OBESITY AFFECTING PREGNANCY, ANTEPARTUM: ICD-10-CM

## 2021-07-28 DIAGNOSIS — O09.891 RADIATION EXPOSURE AFFECTING PREGNANCY IN FIRST TRIMESTER: ICD-10-CM

## 2021-07-28 PROCEDURE — 99213 OFFICE O/P EST LOW 20 MIN: CPT | Performed by: OBSTETRICS & GYNECOLOGY

## 2021-07-28 PROCEDURE — 76816 OB US FOLLOW-UP PER FETUS: CPT | Performed by: OBSTETRICS & GYNECOLOGY

## 2021-07-28 NOTE — PROGRESS NOTES
Reason for Consult:   Dear Will Postal ,    Thank you for requesting ultrasound evaluation and maternal fetal medicine consultation on Aguila Grimaldo. As you are aware she is a 27year old female with a Twin pregnancy.   A maternal-fetal medicine co Age of Onset   • Hypertension Father       Social History    Tobacco Use      Smoking status: Never Smoker      Smokeless tobacco: Never Used    Vaping Use      Vaping Use: Never used    Alcohol use: Never    Drug use: Never       NARRATIVE:     /73 coordination of care. Greater than 50% of this time was spent in face to face discussion with the patient.

## 2021-07-29 ENCOUNTER — LABORATORY ENCOUNTER (OUTPATIENT)
Dept: LAB | Facility: HOSPITAL | Age: 31
End: 2021-07-29
Attending: OBSTETRICS & GYNECOLOGY
Payer: MEDICAID

## 2021-07-29 DIAGNOSIS — Z34.82 ENCOUNTER FOR SUPERVISION OF OTHER NORMAL PREGNANCY IN SECOND TRIMESTER: ICD-10-CM

## 2021-07-29 LAB
1 HR GLUCOSE GESTATIONAL: 142 MG/DL
BASOPHILS # BLD AUTO: 0.07 X10(3) UL (ref 0–0.2)
BASOPHILS NFR BLD AUTO: 0.7 %
DEPRECATED RDW RBC AUTO: 38.7 FL (ref 35.1–46.3)
EOSINOPHIL # BLD AUTO: 0.11 X10(3) UL (ref 0–0.7)
EOSINOPHIL NFR BLD AUTO: 1.1 %
ERYTHROCYTE [DISTWIDTH] IN BLOOD BY AUTOMATED COUNT: 15.8 % (ref 11–15)
GLUCOSE 1H P GLC SERPL-MCNC: 119 MG/DL
GLUCOSE 3H P GLC SERPL-MCNC: 129 MG/DL
GLUCOSE P FAST SERPL-MCNC: 81 MG/DL
HCT VFR BLD AUTO: 27.8 %
HGB BLD-MCNC: 9.3 G/DL
IMM GRANULOCYTES # BLD AUTO: 0.29 X10(3) UL (ref 0–1)
IMM GRANULOCYTES NFR BLD: 2.8 %
LYMPHOCYTES # BLD AUTO: 2.3 X10(3) UL (ref 1–4)
LYMPHOCYTES NFR BLD AUTO: 22.2 %
MCH RBC QN AUTO: 25.3 PG (ref 26–34)
MCHC RBC AUTO-ENTMCNC: 33.5 G/DL (ref 31–37)
MCV RBC AUTO: 75.7 FL
MONOCYTES # BLD AUTO: 0.84 X10(3) UL (ref 0.1–1)
MONOCYTES NFR BLD AUTO: 8.1 %
NEUTROPHILS # BLD AUTO: 6.73 X10 (3) UL (ref 1.5–7.7)
NEUTROPHILS # BLD AUTO: 6.73 X10(3) UL (ref 1.5–7.7)
NEUTROPHILS NFR BLD AUTO: 65.1 %
PLATELET # BLD AUTO: 288 10(3)UL (ref 150–450)
RBC # BLD AUTO: 3.67 X10(6)UL
WBC # BLD AUTO: 10.3 X10(3) UL (ref 4–11)

## 2021-07-29 PROCEDURE — 82951 GLUCOSE TOLERANCE TEST (GTT): CPT

## 2021-07-29 PROCEDURE — 36415 COLL VENOUS BLD VENIPUNCTURE: CPT

## 2021-07-29 PROCEDURE — 85025 COMPLETE CBC W/AUTO DIFF WBC: CPT

## 2021-07-29 PROCEDURE — 82952 GTT-ADDED SAMPLES: CPT

## 2021-08-03 ENCOUNTER — ROUTINE PRENATAL (OUTPATIENT)
Dept: OBGYN CLINIC | Facility: CLINIC | Age: 31
End: 2021-08-03
Payer: MEDICAID

## 2021-08-03 VITALS — SYSTOLIC BLOOD PRESSURE: 113 MMHG | DIASTOLIC BLOOD PRESSURE: 61 MMHG | BODY MASS INDEX: 37 KG/M2 | WEIGHT: 227 LBS

## 2021-08-03 DIAGNOSIS — Z34.83 ENCOUNTER FOR SUPERVISION OF OTHER NORMAL PREGNANCY IN THIRD TRIMESTER: Primary | ICD-10-CM

## 2021-08-03 LAB
BILIRUBIN: NEGATIVE
GLUCOSE (URINE DIPSTICK): NEGATIVE MG/DL
KETONES (URINE DIPSTICK): NEGATIVE MG/DL
MULTISTIX LOT#: ABNORMAL NUMERIC
NITRITE, URINE: NEGATIVE
OCCULT BLOOD: NEGATIVE
PH, URINE: 7 (ref 4.5–8)
PROTEIN (URINE DIPSTICK): NEGATIVE MG/DL
SPECIFIC GRAVITY: 1.02 (ref 1–1.03)
URINE-COLOR: YELLOW
UROBILINOGEN,SEMI-QN: 0.2 MG/DL (ref 0–1.9)

## 2021-08-03 PROCEDURE — 90471 IMMUNIZATION ADMIN: CPT | Performed by: OBSTETRICS & GYNECOLOGY

## 2021-08-03 PROCEDURE — 3078F DIAST BP <80 MM HG: CPT | Performed by: OBSTETRICS & GYNECOLOGY

## 2021-08-03 PROCEDURE — 81002 URINALYSIS NONAUTO W/O SCOPE: CPT | Performed by: OBSTETRICS & GYNECOLOGY

## 2021-08-03 PROCEDURE — 0502F SUBSEQUENT PRENATAL CARE: CPT | Performed by: OBSTETRICS & GYNECOLOGY

## 2021-08-03 PROCEDURE — 90715 TDAP VACCINE 7 YRS/> IM: CPT | Performed by: OBSTETRICS & GYNECOLOGY

## 2021-08-03 PROCEDURE — 3074F SYST BP LT 130 MM HG: CPT | Performed by: OBSTETRICS & GYNECOLOGY

## 2021-08-03 NOTE — PROGRESS NOTES
Reports painful hips. Recommendations reviewed. To try and obtain a cane for balance. Desires Tdap vaccine today. To start weekly NSTs next week for Twin Gestation. Labs reviewed that were done by patient's PCP.   Mildly elevated AST at 47 but all othe

## 2021-08-11 ENCOUNTER — TELEPHONE (OUTPATIENT)
Dept: OBGYN CLINIC | Facility: CLINIC | Age: 31
End: 2021-08-11

## 2021-08-11 NOTE — TELEPHONE ENCOUNTER
OB History     T2    L2    SAB0  TAB2  Ectopic0  Multiple0  Live Births2   32w0d    Patient states she had a really bad nose bleed today. Not currently bleeding. Patient mentioned she did feel lightheaded.  Denies history of nose bleeds, headach

## 2021-08-12 ENCOUNTER — HOSPITAL ENCOUNTER (OUTPATIENT)
Dept: PERINATAL CARE | Facility: HOSPITAL | Age: 31
Discharge: HOME | End: 2021-08-12
Attending: OBSTETRICS & GYNECOLOGY
Payer: MEDICAID

## 2021-08-12 ENCOUNTER — HOSPITAL ENCOUNTER (OUTPATIENT)
Facility: HOSPITAL | Age: 31
End: 2021-08-12
Attending: OBSTETRICS & GYNECOLOGY | Admitting: OBSTETRICS & GYNECOLOGY
Payer: MEDICAID

## 2021-08-12 ENCOUNTER — HOSPITAL ENCOUNTER (OUTPATIENT)
Facility: HOSPITAL | Age: 31
Setting detail: OBSERVATION
Discharge: HOME | End: 2021-08-12
Attending: OBSTETRICS & GYNECOLOGY | Admitting: OBSTETRICS & GYNECOLOGY
Payer: MEDICAID

## 2021-08-12 VITALS
TEMPERATURE: 98 F | RESPIRATION RATE: 20 BRPM | SYSTOLIC BLOOD PRESSURE: 116 MMHG | HEART RATE: 97 BPM | DIASTOLIC BLOOD PRESSURE: 66 MMHG

## 2021-08-12 DIAGNOSIS — O30.049 DICHORIONIC DIAMNIOTIC TWIN PREGNANCY, ANTEPARTUM: Primary | ICD-10-CM

## 2021-08-12 DIAGNOSIS — O99.210 OBESITY AFFECTING PREGNANCY, ANTEPARTUM: ICD-10-CM

## 2021-08-12 LAB — FIBRONECTIN FETAL SPEC QL: NEGATIVE

## 2021-08-12 PROCEDURE — 99219 INITIAL OBSERVATION CARE,LEVL II: CPT | Performed by: OBSTETRICS & GYNECOLOGY

## 2021-08-12 PROCEDURE — 59025 FETAL NON-STRESS TEST: CPT

## 2021-08-12 PROCEDURE — 59025 FETAL NON-STRESS TEST: CPT | Performed by: OBSTETRICS & GYNECOLOGY

## 2021-08-12 NOTE — PROGRESS NOTES
Pt is a 27year old female admitted to TR2/TR2-A. Patient presents with:  Non-stress Test: only one twin reactive in MFM  R/o  Labor: ctx q 10 min per pt     Pt is [id number] 32w1d intra-uterine pregnancy. History obtained, consents signed.  Oriented

## 2021-08-12 NOTE — PROGRESS NOTES
Dr Isaac Ledezma reevaluated pt in triage. Verbal order for clean catch urine culture and dc orders received. He will f/u with her regarding urine results.

## 2021-08-13 NOTE — H&P
[facility] Patient Status:  Observation    1990 MRN [id number]   Location [address] [place] [address] Attending No att. providers found   [facility] Day # 0 PCP DO MONI Kapoor Hepatitis B  Nonreactive   Nonreactive  03/02/21 0826    HIV Combo  Non-Reactive  Non-Reactive 03/02/21 0826          Optional Initial Labs     Test Value Reference Range Date Time    TSH        HCV  Nonreactive   Nonreactive  03/02/21 0826    Pap Smear  N Test Value Reference Range Date Time    HgB A1c        HGB Electrophoresis        Varicella Zoster        Cystic Fibrosis-Old         Cystic Fibrosis[32] (Required questions in OE to answer)        Cystic Fibrosis[165] (Required questions in OE to answer) Rate accelerations: yes   Fetal Surveillance baby B: 130s BPM; Fetal heart variability: moderate  Fetal Heart Rate decelerations: none  Fetal Heart Rate accelerations: yes  Uterine contractions: Occasional contractions  Sterile vaginal exam: Dilation: fing

## 2021-08-13 NOTE — TRIAGE
[facility]      Triage Note    Dilcia Torres Patient Status:  Observation    1990 MRN [id number]   Location [address]  Attending Adilene Obregon MD   Hosp Day # 0 PCP Doy Fee, DO     Daisy Counts education.      Patient presents with:  Non-stress Test: only one twin reactive in MFM  R/o  Labor: ctx q 10 min per pt        Shweta Oneil RN  2021 7:40 PM

## 2021-08-17 ENCOUNTER — ROUTINE PRENATAL (OUTPATIENT)
Dept: OBGYN CLINIC | Facility: CLINIC | Age: 31
End: 2021-08-17
Payer: MEDICAID

## 2021-08-17 VITALS — BODY MASS INDEX: 36 KG/M2 | WEIGHT: 226 LBS | SYSTOLIC BLOOD PRESSURE: 112 MMHG | DIASTOLIC BLOOD PRESSURE: 60 MMHG

## 2021-08-17 DIAGNOSIS — Z34.83 ENCOUNTER FOR SUPERVISION OF OTHER NORMAL PREGNANCY IN THIRD TRIMESTER: Primary | ICD-10-CM

## 2021-08-17 LAB
APPEARANCE: CLEAR
BILIRUBIN: NEGATIVE
GLUCOSE (URINE DIPSTICK): NEGATIVE MG/DL
KETONES (URINE DIPSTICK): NEGATIVE MG/DL
MULTISTIX LOT#: 5077 NUMERIC
NITRITE, URINE: NEGATIVE
OCCULT BLOOD: NEGATIVE
PH, URINE: 7 (ref 4.5–8)
SPECIFIC GRAVITY: 1.02 (ref 1–1.03)
URINE-COLOR: YELLOW
UROBILINOGEN,SEMI-QN: 0.2 MG/DL (ref 0–1.9)

## 2021-08-17 PROCEDURE — 3078F DIAST BP <80 MM HG: CPT | Performed by: OBSTETRICS & GYNECOLOGY

## 2021-08-17 PROCEDURE — 81002 URINALYSIS NONAUTO W/O SCOPE: CPT | Performed by: OBSTETRICS & GYNECOLOGY

## 2021-08-17 PROCEDURE — 0502F SUBSEQUENT PRENATAL CARE: CPT | Performed by: OBSTETRICS & GYNECOLOGY

## 2021-08-17 PROCEDURE — 3074F SYST BP LT 130 MM HG: CPT | Performed by: OBSTETRICS & GYNECOLOGY

## 2021-08-19 ENCOUNTER — NST DOCUMENTATION (OUTPATIENT)
Dept: OBGYN CLINIC | Facility: CLINIC | Age: 31
End: 2021-08-19

## 2021-08-19 ENCOUNTER — HOSPITAL ENCOUNTER (OUTPATIENT)
Dept: PERINATAL CARE | Facility: HOSPITAL | Age: 31
Discharge: HOME | End: 2021-08-19
Attending: OBSTETRICS & GYNECOLOGY
Payer: MEDICAID

## 2021-08-19 DIAGNOSIS — O30.043 DICHORIONIC DIAMNIOTIC TWIN PREGNANCY IN THIRD TRIMESTER: ICD-10-CM

## 2021-08-19 DIAGNOSIS — O30.049 DICHORIONIC DIAMNIOTIC TWIN PREGNANCY, ANTEPARTUM: Primary | ICD-10-CM

## 2021-08-19 DIAGNOSIS — O99.210 OBESITY AFFECTING PREGNANCY, ANTEPARTUM: ICD-10-CM

## 2021-08-19 PROCEDURE — 59025 FETAL NON-STRESS TEST: CPT | Performed by: OBSTETRICS & GYNECOLOGY

## 2021-08-19 PROCEDURE — 59025 FETAL NON-STRESS TEST: CPT

## 2021-08-19 NOTE — PROGRESS NOTES
Emelina Han     Dear Dr. Chase Chambers,     Thank you for requesting ultrasound evaluation and maternal fetal medicine consultation on your patient Dread Savage.   As you are aware she is a 27year old female  with a TWI ultrasound cannot rule out all structural and chromosomal abnormalities. Discordance - 0.5 %     See imaging tab for the complete US report.      DISCUSSION  During her visit we discussed and reviewed the following issues:  DIAMNIOTIC DICHORIONIC TWIN GE

## 2021-08-20 NOTE — NST
Nonstress Test   Patient: Jannie Coronado    Gestation: 33w1d    Diagnosis from order:         NST:         NST DOCUMENTATION 8/19/2021   Variability 6-25 BPM   Accelerations Yes   Decelerations None   Baseline 140   Uterine Irritability Yes   Contractio

## 2021-08-26 ENCOUNTER — TELEPHONE (OUTPATIENT)
Dept: OBGYN CLINIC | Facility: CLINIC | Age: 31
End: 2021-08-26

## 2021-08-26 ENCOUNTER — ROUTINE PRENATAL (OUTPATIENT)
Dept: OBGYN CLINIC | Facility: CLINIC | Age: 31
End: 2021-08-26
Payer: MEDICAID

## 2021-08-26 ENCOUNTER — HOSPITAL ENCOUNTER (OUTPATIENT)
Dept: PERINATAL CARE | Facility: HOSPITAL | Age: 31
Discharge: HOME | End: 2021-08-26
Attending: OBSTETRICS & GYNECOLOGY | Admitting: OBSTETRICS & GYNECOLOGY
Payer: MEDICAID

## 2021-08-26 ENCOUNTER — HOSPITAL ENCOUNTER (OUTPATIENT)
Dept: PERINATAL CARE | Facility: HOSPITAL | Age: 31
Setting detail: OBSERVATION
Discharge: HOME | End: 2021-08-26
Attending: OBSTETRICS & GYNECOLOGY
Payer: MEDICAID

## 2021-08-26 VITALS — SYSTOLIC BLOOD PRESSURE: 122 MMHG | WEIGHT: 227.19 LBS | BODY MASS INDEX: 37 KG/M2 | DIASTOLIC BLOOD PRESSURE: 72 MMHG

## 2021-08-26 VITALS
DIASTOLIC BLOOD PRESSURE: 68 MMHG | WEIGHT: 226 LBS | SYSTOLIC BLOOD PRESSURE: 127 MMHG | BODY MASS INDEX: 36 KG/M2 | HEART RATE: 86 BPM

## 2021-08-26 DIAGNOSIS — O99.210 OBESITY AFFECTING PREGNANCY, ANTEPARTUM: ICD-10-CM

## 2021-08-26 DIAGNOSIS — O30.049 DICHORIONIC DIAMNIOTIC TWIN PREGNANCY, ANTEPARTUM: ICD-10-CM

## 2021-08-26 DIAGNOSIS — O30.009 TWIN PREGNANCY, ANTEPARTUM, UNSPECIFIED MULTIPLE GESTATION TYPE: Primary | ICD-10-CM

## 2021-08-26 DIAGNOSIS — O30.049 DICHORIONIC DIAMNIOTIC TWIN PREGNANCY, ANTEPARTUM: Primary | ICD-10-CM

## 2021-08-26 DIAGNOSIS — Z01.812 PRE-PROCEDURAL LABORATORY EXAMINATION: Primary | ICD-10-CM

## 2021-08-26 DIAGNOSIS — O09.891 RADIATION EXPOSURE AFFECTING PREGNANCY IN FIRST TRIMESTER: ICD-10-CM

## 2021-08-26 DIAGNOSIS — Z34.83 ENCOUNTER FOR SUPERVISION OF OTHER NORMAL PREGNANCY IN THIRD TRIMESTER: Primary | ICD-10-CM

## 2021-08-26 PROBLEM — Z34.90 SUPERVISION OF NORMAL PREGNANCY (HCC): Status: ACTIVE | Noted: 2021-05-26

## 2021-08-26 PROBLEM — Z34.90 SUPERVISION OF NORMAL PREGNANCY: Status: ACTIVE | Noted: 2021-05-26

## 2021-08-26 LAB
APPEARANCE: CLEAR
BILIRUBIN: NEGATIVE
GLUCOSE (URINE DIPSTICK): NEGATIVE MG/DL
KETONES (URINE DIPSTICK): NEGATIVE MG/DL
MULTISTIX LOT#: 5077 NUMERIC
NITRITE, URINE: NEGATIVE
OCCULT BLOOD: NEGATIVE
PH, URINE: 6 (ref 4.5–8)
PROTEIN (URINE DIPSTICK): NEGATIVE MG/DL
SPECIFIC GRAVITY: 1.01 (ref 1–1.03)
URINE-COLOR: YELLOW
UROBILINOGEN,SEMI-QN: 0.2 MG/DL (ref 0–1.9)

## 2021-08-26 PROCEDURE — 99214 OFFICE O/P EST MOD 30 MIN: CPT | Performed by: OBSTETRICS & GYNECOLOGY

## 2021-08-26 PROCEDURE — 3078F DIAST BP <80 MM HG: CPT | Performed by: OBSTETRICS & GYNECOLOGY

## 2021-08-26 PROCEDURE — 81002 URINALYSIS NONAUTO W/O SCOPE: CPT | Performed by: OBSTETRICS & GYNECOLOGY

## 2021-08-26 PROCEDURE — 76819 FETAL BIOPHYS PROFIL W/O NST: CPT | Performed by: OBSTETRICS & GYNECOLOGY

## 2021-08-26 PROCEDURE — 0502F SUBSEQUENT PRENATAL CARE: CPT | Performed by: OBSTETRICS & GYNECOLOGY

## 2021-08-26 PROCEDURE — 76816 OB US FOLLOW-UP PER FETUS: CPT | Performed by: OBSTETRICS & GYNECOLOGY

## 2021-08-26 PROCEDURE — 3074F SYST BP LT 130 MM HG: CPT | Performed by: OBSTETRICS & GYNECOLOGY

## 2021-08-26 NOTE — TELEPHONE ENCOUNTER
Patient name and  verified. Patient scheduled for csection on 21 at 0730. Covid lab order entered.  Patient notified

## 2021-08-26 NOTE — TELEPHONE ENCOUNTER
Pt has weekly NST's scheduled through September. Pt seen in the office today per ajb. Repeat Growth ultrasound order placed. Pt called and informed pt to call MFM tomorrow to schedule. Pt voices understanding.       Teresita Ghosh MD  P Em Wmob Ob/Gyne C

## 2021-08-26 NOTE — PROGRESS NOTES
Complains of irregular contractions some days. Difficult to ambulate suprapubic and lower back pressure. Discussed possible physical therapy referral for a walk assist device as recommended by Dr. Harman Mcgovern.   Patient wishes to make to his best that she can a

## 2021-08-26 NOTE — TELEPHONE ENCOUNTER
Please schedule the following surgery:    Procedure: Primary  section  Surgeon: on call ob Dr. Rodriguez Sink: (Y/N or none) OB  Date:   Dx:  Twin gestation with malpresentation; 45 weeks gestational age  Pre-op appt: (Y/N o

## 2021-09-02 ENCOUNTER — HOSPITAL ENCOUNTER (OUTPATIENT)
Dept: PERINATAL CARE | Facility: HOSPITAL | Age: 31
Discharge: HOME | End: 2021-09-02
Attending: ADVANCED PRACTICE MIDWIFE
Payer: MEDICAID

## 2021-09-02 ENCOUNTER — TELEPHONE (OUTPATIENT)
Dept: PERINATAL CARE | Facility: HOSPITAL | Age: 31
End: 2021-09-02

## 2021-09-02 DIAGNOSIS — O99.210 OBESITY AFFECTING PREGNANCY, ANTEPARTUM: ICD-10-CM

## 2021-09-02 DIAGNOSIS — O30.049 DICHORIONIC DIAMNIOTIC TWIN PREGNANCY, ANTEPARTUM: ICD-10-CM

## 2021-09-02 DIAGNOSIS — O28.8 NON-REACTIVE NST (NON-STRESS TEST): ICD-10-CM

## 2021-09-02 DIAGNOSIS — O30.043 DICHORIONIC DIAMNIOTIC TWIN PREGNANCY IN THIRD TRIMESTER: ICD-10-CM

## 2021-09-02 DIAGNOSIS — O30.043 DICHORIONIC DIAMNIOTIC TWIN PREGNANCY IN THIRD TRIMESTER: Primary | ICD-10-CM

## 2021-09-02 DIAGNOSIS — O28.8 NON-REACTIVE NST (NON-STRESS TEST): Primary | ICD-10-CM

## 2021-09-02 PROCEDURE — 59025 FETAL NON-STRESS TEST: CPT

## 2021-09-02 PROCEDURE — 76815 OB US LIMITED FETUS(S): CPT | Performed by: OBSTETRICS & GYNECOLOGY

## 2021-09-02 PROCEDURE — 76819 FETAL BIOPHYS PROFIL W/O NST: CPT | Performed by: OBSTETRICS & GYNECOLOGY

## 2021-09-02 PROCEDURE — 76819 FETAL BIOPHYS PROFIL W/O NST: CPT | Performed by: ADVANCED PRACTICE MIDWIFE

## 2021-09-02 NOTE — PROGRESS NOTES
Encounter for twin limited and biophysical profile due to nonreactive NST in twins. Requested by Dr. Lillian Newman. Twin A - IUP in breech right presentation. Placenta is posterior. Cardiac activity is present, 157 bpm  MVP is 6.1 cm. BPP is 8/8.       Tw

## 2021-09-03 NOTE — TELEPHONE ENCOUNTER
PATIENT SCHEDULED FOR [id number]/[id number] FOR TWINS ON 9/16/21 AND REQUIRES PRIOR AUTHORIZATION.     Oliverio Sor

## 2021-09-08 ENCOUNTER — TELEPHONE (OUTPATIENT)
Dept: OBGYN CLINIC | Facility: CLINIC | Age: 31
End: 2021-09-08

## 2021-09-08 NOTE — TELEPHONE ENCOUNTER
Authorization Number: [id number]  Case Number: [id number]     Status: Pending Medical Director Review  P2P Status:   Approval Date:   Service Code: [id number]  Service Description: Fetal biophys profil w/o nst  Site Name: [address]  Expiration Date:   D

## 2021-09-08 NOTE — TELEPHONE ENCOUNTER
Primary Diagnosis Code: O30.049 Description: Twin pregnancy, dichorionic/diamniotic, unspecified trimester  Secondary Diagnosis Code: O99.210 Description: Obesity complicating pregnancy, unspecified trimester  Date of Service: Not provided    CPT Code: 07091 Description: Fetal biophys profil w/o nst  Case Number: [id number]  Review Date: 9/8/2021 9:23:46 AM  Expiration Date: N/A  Status: Your case has been sent to Medical Review.

## 2021-09-09 ENCOUNTER — TELEPHONE (OUTPATIENT)
Dept: OBGYN CLINIC | Facility: CLINIC | Age: 31
End: 2021-09-09

## 2021-09-09 NOTE — TELEPHONE ENCOUNTER
Authorization Number: [id number]  Case Number: [id number]     Status: Approved  P2P Status:   Approval Date: 9/8/2021 12:00:00 AM  Service Code: [id number]  Service Description: Fetal biophys profil w/o nst  Site Name: [address] [facility]

## 2021-09-09 NOTE — TELEPHONE ENCOUNTER
Incoming call received from [facility] from [place] [address].  Wanted to inform ob office patient delivered at [facility] this am.

## 2021-11-02 ENCOUNTER — POSTPARTUM (OUTPATIENT)
Dept: OBGYN CLINIC | Facility: CLINIC | Age: 31
End: 2021-11-02
Payer: MEDICAID

## 2021-11-02 VITALS
DIASTOLIC BLOOD PRESSURE: 76 MMHG | HEART RATE: 78 BPM | WEIGHT: 210 LBS | SYSTOLIC BLOOD PRESSURE: 122 MMHG | BODY MASS INDEX: 34 KG/M2

## 2021-11-02 DIAGNOSIS — Z30.09 BIRTH CONTROL COUNSELING: ICD-10-CM

## 2021-11-02 DIAGNOSIS — O99.019 ANTEPARTUM ANEMIA: ICD-10-CM

## 2021-11-02 DIAGNOSIS — M25.552 LEFT HIP PAIN: ICD-10-CM

## 2021-11-02 PROCEDURE — 96372 THER/PROPH/DIAG INJ SC/IM: CPT | Performed by: ADVANCED PRACTICE MIDWIFE

## 2021-11-02 PROCEDURE — 3074F SYST BP LT 130 MM HG: CPT | Performed by: ADVANCED PRACTICE MIDWIFE

## 2021-11-02 PROCEDURE — 81025 URINE PREGNANCY TEST: CPT | Performed by: ADVANCED PRACTICE MIDWIFE

## 2021-11-02 PROCEDURE — 3078F DIAST BP <80 MM HG: CPT | Performed by: ADVANCED PRACTICE MIDWIFE

## 2021-11-02 PROCEDURE — 0503F POSTPARTUM CARE VISIT: CPT | Performed by: ADVANCED PRACTICE MIDWIFE

## 2021-11-02 RX ORDER — MEDROXYPROGESTERONE ACETATE 150 MG/ML
150 INJECTION, SUSPENSION INTRAMUSCULAR ONCE
Status: COMPLETED | OUTPATIENT
Start: 2021-11-02 | End: 2021-11-02

## 2021-11-02 RX ADMIN — MEDROXYPROGESTERONE ACETATE 150 MG: 150 INJECTION, SUSPENSION INTRAMUSCULAR at 11:25:00

## 2021-11-02 NOTE — PROGRESS NOTES
Patient here for postpartum check-up. This patient has no babies on file.   She had prenatal care with [facility], but delivered at [facility] as things happened fast and didn't have time to make it to hospital. She scheduled online through [employer] for PP visit and Perineum concerns: No pain, No hemorrhoids, No laceration repair  EPDS Score: Total: 3  MEAGAN screen: 0  Considering Depo for BC method. Has used in past. Planned tubal ligation with her scheduled c/s. Physical Exam  Vitals reviewed.    [place]

## 2021-12-02 ENCOUNTER — TELEPHONE (OUTPATIENT)
Dept: OBGYN CLINIC | Facility: CLINIC | Age: 31
End: 2021-12-02

## 2021-12-02 NOTE — TELEPHONE ENCOUNTER
Reminded pt complete overdue cbc. Info for [facility] given to pt.  Pt verbalized an understanding & agrees w/ plan

## 2022-02-04 ENCOUNTER — TELEPHONE (OUTPATIENT)
Dept: OBGYN CLINIC | Facility: CLINIC | Age: 32
End: 2022-02-04

## 2022-02-04 NOTE — TELEPHONE ENCOUNTER
Reminded pt to complete overdue cbc. Pt states she is seeing another doctor now. Order canceled.  Pt verbalized an understanding & agrees w/ plan

## 2024-03-18 ENCOUNTER — HOSPITAL ENCOUNTER (INPATIENT)
Facility: HOSPITAL | Age: 34
LOS: 3 days | Discharge: HOME | End: 2024-03-21
Attending: HOSPITALIST | Admitting: HOSPITALIST
Payer: MEDICAID

## 2024-03-18 DIAGNOSIS — M62.82 NON-TRAUMATIC RHABDOMYOLYSIS: Primary | ICD-10-CM

## 2024-03-18 LAB
ALBUMIN SERPL-MCNC: 4.3 G/DL (ref 3.2–4.8)
ALBUMIN/GLOB SERPL: 1.3 {RATIO} (ref 1–2)
ALP LIVER SERPL-CCNC: 80 U/L
ALT SERPL-CCNC: 152 U/L
ANION GAP SERPL CALC-SCNC: 7 MMOL/L (ref 0–18)
AST SERPL-CCNC: 512 U/L (ref ?–34)
B-HCG UR QL: NEGATIVE
BASOPHILS # BLD AUTO: 0.02 X10(3) UL (ref 0–0.2)
BASOPHILS NFR BLD AUTO: 0.3 %
BILIRUB SERPL-MCNC: 0.4 MG/DL (ref 0.3–1.2)
BILIRUB UR QL: NEGATIVE
BUN BLD-MCNC: 6 MG/DL (ref 9–23)
BUN/CREAT SERPL: 9.5 (ref 10–20)
CALCIUM BLD-MCNC: 9.7 MG/DL (ref 8.7–10.4)
CHLORIDE SERPL-SCNC: 108 MMOL/L (ref 98–112)
CK SERPL-CCNC: ABNORMAL U/L
CO2 SERPL-SCNC: 25 MMOL/L (ref 21–32)
COLOR UR: YELLOW
CREAT BLD-MCNC: 0.63 MG/DL
DEPRECATED RDW RBC AUTO: 38.9 FL (ref 35.1–46.3)
EGFRCR SERPLBLD CKD-EPI 2021: 120 ML/MIN/1.73M2 (ref 60–?)
EOSINOPHIL # BLD AUTO: 0.18 X10(3) UL (ref 0–0.7)
EOSINOPHIL NFR BLD AUTO: 2.5 %
ERYTHROCYTE [DISTWIDTH] IN BLOOD BY AUTOMATED COUNT: 13.9 % (ref 11–15)
GLOBULIN PLAS-MCNC: 3.4 G/DL (ref 2.8–4.4)
GLUCOSE BLD-MCNC: 96 MG/DL (ref 70–99)
GLUCOSE UR-MCNC: NORMAL MG/DL
HCT VFR BLD AUTO: 37 %
HGB BLD-MCNC: 12.7 G/DL
IMM GRANULOCYTES # BLD AUTO: 0.02 X10(3) UL (ref 0–1)
IMM GRANULOCYTES NFR BLD: 0.3 %
KETONES UR-MCNC: NEGATIVE MG/DL
LEUKOCYTE ESTERASE UR QL STRIP.AUTO: 250
LYMPHOCYTES # BLD AUTO: 1.94 X10(3) UL (ref 1–4)
LYMPHOCYTES NFR BLD AUTO: 26.8 %
MCH RBC QN AUTO: 26.6 PG (ref 26–34)
MCHC RBC AUTO-ENTMCNC: 34.3 G/DL (ref 31–37)
MCV RBC AUTO: 77.4 FL
MONOCYTES # BLD AUTO: 0.49 X10(3) UL (ref 0.1–1)
MONOCYTES NFR BLD AUTO: 6.8 %
NEUTROPHILS # BLD AUTO: 4.6 X10 (3) UL (ref 1.5–7.7)
NEUTROPHILS # BLD AUTO: 4.6 X10(3) UL (ref 1.5–7.7)
NEUTROPHILS NFR BLD AUTO: 63.3 %
NITRITE UR QL STRIP.AUTO: NEGATIVE
OSMOLALITY SERPL CALC.SUM OF ELEC: 287 MOSM/KG (ref 275–295)
PH UR: 5.5 [PH] (ref 5–8)
PLATELET # BLD AUTO: 280 10(3)UL (ref 150–450)
POTASSIUM SERPL-SCNC: 3.8 MMOL/L (ref 3.5–5.1)
PROT SERPL-MCNC: 7.7 G/DL (ref 5.7–8.2)
PROT UR-MCNC: 50 MG/DL
RBC # BLD AUTO: 4.78 X10(6)UL
SODIUM SERPL-SCNC: 140 MMOL/L (ref 136–145)
SP GR UR STRIP: 1.01 (ref 1–1.03)
UROBILINOGEN UR STRIP-ACNC: NORMAL
WBC # BLD AUTO: 7.3 X10(3) UL (ref 4–11)

## 2024-03-18 PROCEDURE — 99222 1ST HOSP IP/OBS MODERATE 55: CPT | Performed by: HOSPITALIST

## 2024-03-18 RX ORDER — ONDANSETRON 2 MG/ML
4 INJECTION INTRAMUSCULAR; INTRAVENOUS EVERY 6 HOURS PRN
Status: DISCONTINUED | OUTPATIENT
Start: 2024-03-18 | End: 2024-03-21

## 2024-03-18 RX ORDER — MORPHINE SULFATE 4 MG/ML
4 INJECTION, SOLUTION INTRAMUSCULAR; INTRAVENOUS ONCE
Status: COMPLETED | OUTPATIENT
Start: 2024-03-18 | End: 2024-03-18

## 2024-03-18 RX ORDER — ACETAMINOPHEN 500 MG
500 TABLET ORAL EVERY 4 HOURS PRN
Status: DISCONTINUED | OUTPATIENT
Start: 2024-03-18 | End: 2024-03-21

## 2024-03-18 RX ORDER — PROCHLORPERAZINE EDISYLATE 5 MG/ML
5 INJECTION INTRAMUSCULAR; INTRAVENOUS EVERY 8 HOURS PRN
Status: DISCONTINUED | OUTPATIENT
Start: 2024-03-18 | End: 2024-03-21

## 2024-03-18 RX ORDER — SODIUM CHLORIDE 9 MG/ML
INJECTION, SOLUTION INTRAVENOUS CONTINUOUS
Status: DISCONTINUED | OUTPATIENT
Start: 2024-03-18 | End: 2024-03-21

## 2024-03-18 RX ORDER — HYDROCODONE BITARTRATE AND ACETAMINOPHEN 5; 325 MG/1; MG/1
2 TABLET ORAL EVERY 4 HOURS PRN
Status: DISCONTINUED | OUTPATIENT
Start: 2024-03-18 | End: 2024-03-21

## 2024-03-18 RX ORDER — SODIUM CHLORIDE 9 MG/ML
1000 INJECTION, SOLUTION INTRAVENOUS CONTINUOUS
Status: DISCONTINUED | OUTPATIENT
Start: 2024-03-18 | End: 2024-03-19

## 2024-03-18 RX ORDER — KETOROLAC TROMETHAMINE 15 MG/ML
15 INJECTION, SOLUTION INTRAMUSCULAR; INTRAVENOUS ONCE
Status: COMPLETED | OUTPATIENT
Start: 2024-03-18 | End: 2024-03-18

## 2024-03-18 RX ORDER — TEMAZEPAM 15 MG/1
15 CAPSULE ORAL NIGHTLY PRN
Status: DISCONTINUED | OUTPATIENT
Start: 2024-03-18 | End: 2024-03-21

## 2024-03-18 RX ORDER — ACETAMINOPHEN 325 MG/1
650 TABLET ORAL EVERY 4 HOURS PRN
Status: DISCONTINUED | OUTPATIENT
Start: 2024-03-18 | End: 2024-03-21

## 2024-03-18 RX ORDER — HYDROCODONE BITARTRATE AND ACETAMINOPHEN 5; 325 MG/1; MG/1
1 TABLET ORAL EVERY 4 HOURS PRN
Status: DISCONTINUED | OUTPATIENT
Start: 2024-03-18 | End: 2024-03-21

## 2024-03-18 RX ORDER — HEPARIN SODIUM 5000 [USP'U]/ML
5000 INJECTION, SOLUTION INTRAVENOUS; SUBCUTANEOUS EVERY 12 HOURS SCHEDULED
Status: DISCONTINUED | OUTPATIENT
Start: 2024-03-18 | End: 2024-03-21

## 2024-03-18 NOTE — ED INITIAL ASSESSMENT (HPI)
Patient to triage via wheelchair, c/o pain in legs, started yesterday. Stated she is unable to touch her legs due to pain. Also stating they are starting to feel numb.

## 2024-03-18 NOTE — ED PROVIDER NOTES
Patient Seen in: [facility] Emergency Department      History     Chief Complaint   Patient presents with    Pain     Stated Complaint: leg pain    Subjective:   32yo/f w no chronic medical problems reports with bilateral anterior thigh pain. Reports very sensitive, pain with walking. She rode a bike/exercised around 0800 on Saturday. Yesterday (Sunday) pain started slowly and worsened gradually. No chest pain, dyspnea. No swelling. No weakness. No posterior leg pain. No edema. Does endorse dark urine.             Objective:   Past Medical History:   Diagnosis Date    Human papilloma virus infection 2020    high risk/coploscopy     Obesity     Ovarian cyst               History reviewed. No pertinent surgical history.             Social History     Socioeconomic History    Marital status: Life Partner   Tobacco Use    Smoking status: Never    Smokeless tobacco: Never   Vaping Use    Vaping Use: Never used   Substance and Sexual Activity    Alcohol use: Never    Drug use: Never     Social Determinants of Health     Food Insecurity: No Food Insecurity (3/18/2024)    Food Insecurity     Food Insecurity: Never true   Transportation Needs: No Transportation Needs (3/18/2024)    Transportation Needs     Lack of Transportation: No   Housing Stability: Low Risk  (3/18/2024)    Housing Stability     Housing Instability: No              Review of Systems   All other systems reviewed and are negative.      Positive for stated complaint: leg pain  Other systems are as noted in HPI.  Constitutional and vital signs reviewed.      All other systems reviewed and negative except as noted above.    Physical Exam     ED Triage Vitals [03/18/24 1527]   /84   Pulse 85   Resp 20   Temp 97.6 °F (36.4 °C)   Temp src Temporal   SpO2 100 %   O2 Device None (Room air)       Current:/71 (BP Location: Left arm)   Pulse 98   Temp 98.4 °F (36.9 °C) (Oral)   Resp 18   Ht 167.6 cm (5' 6\")   Wt 111 kg   SpO2 95%   BMI  39.51 kg/m²         Physical Exam  Vitals and nursing note reviewed.   Constitutional:       General: She is not in acute distress.     Appearance: She is well-developed.   HENT:      Head: Normocephalic and atraumatic.      Nose: Nose normal.      Mouth/Throat:      Mouth: Mucous membranes are moist.   Eyes:      Conjunctiva/sclera: Conjunctivae normal.      Pupils: Pupils are equal, round, and reactive to light.   Cardiovascular:      Rate and Rhythm: Normal rate and regular rhythm.      Heart sounds: Normal heart sounds.   Pulmonary:      Effort: Pulmonary effort is normal.      Breath sounds: Normal breath sounds.   Abdominal:      General: Bowel sounds are normal.      Palpations: Abdomen is soft.   Musculoskeletal:         General: No tenderness or deformity. Normal range of motion.      Cervical back: Normal range of motion and neck supple.   Skin:     General: Skin is warm and dry.      Capillary Refill: Capillary refill takes less than 2 seconds.      Findings: No rash.      Comments: Normal color   Neurological:      General: No focal deficit present.      Mental Status: She is alert and oriented to person, place, and time.      GCS: GCS eye subscore is 4. GCS verbal subscore is 5. GCS motor subscore is 6.      Cranial Nerves: No cranial nerve deficit.      Gait: Gait normal.           ED Course     Labs Reviewed   COMP METABOLIC PANEL (14) - Abnormal; Notable for the following components:       Result Value    BUN 6 (*)     BUN/CREA Ratio 9.5 (*)      (*)      (*)     All other components within normal limits   CK CREATINE KINASE (NOT CREATININE) - Abnormal; Notable for the following components:    CK 33,607 (*)     All other components within normal limits   URINALYSIS, ROUTINE - Abnormal; Notable for the following components:    Clarity Urine Turbid (*)     Blood Urine 3+ (*)     Protein Urine 50 (*)     Leukocyte Esterase Urine 250 (*)     WBC Urine 11-20 (*)     RBC Urine 6-10 (*)      Bacteria Urine Rare (*)     Squamous Epi. Cells Moderate (*)     All other components within normal limits   BASIC METABOLIC PANEL (8) - Abnormal; Notable for the following components:    BUN 6 (*)     BUN/CREA Ratio 9.8 (*)     All other components within normal limits   CK CREATINE KINASE (NOT CREATININE) - Abnormal; Notable for the following components:    CK 31,569 (*)     All other components within normal limits   CBC W/ DIFFERENTIAL - Abnormal; Notable for the following components:    MCV 77.4 (*)     All other components within normal limits   CBC W/ DIFFERENTIAL - Abnormal; Notable for the following components:    HGB 11.1 (*)     HCT 32.9 (*)     MCV 78.3 (*)     All other components within normal limits   POCT PREGNANCY URINE - Normal   CBC WITH DIFFERENTIAL WITH PLATELET    Narrative:     The following orders were created for panel order CBC With Differential With Platelet.  Procedure                               Abnormality         Status                     ---------                               -----------         ------                     CBC W/ DIFFERENTIAL[[id number]]          Abnormal            Final result                 Please view results for these tests on the individual orders.   CBC WITH DIFFERENTIAL WITH PLATELET    Narrative:     The following orders were created for panel order CBC With Differential With Platelet.  Procedure                               Abnormality         Status                     ---------                               -----------         ------                     CBC W/ DIFFERENTIAL[[id number]]          Abnormal            Final result                 Please view results for these tests on the individual orders.              [facility]        Admission disposition: 3/18/2024  6:06 PM            Medical Decision Making  34yo/f w hx and exam as stated, leg pain    Non toxic, stable appearing  CK 33,000  No fever  No vomiting  Creatinine wnl  Elevated LFTs  Overall  stable    Discussed with nephrology  Discussed with Dr. Lawson who will admit    Problems Addressed:  Non-traumatic rhabdomyolysis: acute illness or injury    Amount and/or Complexity of Data Reviewed  Labs:  Decision-making details documented in ED Course.  Radiology:  Decision-making details documented in ED Course.    Risk  OTC drugs.  Prescription drug management.        Disposition and Plan     Clinical Impression:  1. Non-traumatic rhabdomyolysis         Disposition:  Admit  3/18/2024  6:06 pm    Follow-up:  No follow-up provider specified.        Medications Prescribed:  Current Discharge Medication List                            Hospital Problems       Present on Admission  Date Reviewed: 11/2/2021            ICD-10-CM Noted POA    * (Principal) Non-traumatic rhabdomyolysis M62.82 3/18/2024 Unknown

## 2024-03-18 NOTE — H&P
[facility]    PATIENT'S NAME: JULIO THIBODEAUX   ATTENDING PHYSICIAN: Mirian Lawson MD   PATIENT ACCOUNT#:   [id number]    LOCATION:  [address]  MEDICAL RECORD #:   [id number]       YOB: 1990  ADMISSION DATE:       03/18/2024    HISTORY AND PHYSICAL EXAMINATION    CHIEF COMPLAINT:  Rhabdomyolysis.    HISTORY OF PRESENT ILLNESS:  The patient is a 33-year-old  female who started bike exercise regimen 2 days ago and yesterday started developing extreme pain in her both thigh muscle areas and today pain got worse, and she decided to come into the emergency department for evaluation.  CBC and chemistry were unremarkable.  Urinalysis showed mild leukocyte esterase 3+ blood and CK was 33,600.  Started on aggressive IV fluids, and she will be admitted to the hospital for further management.    PAST MEDICAL HISTORY:  None.    PAST SURGICAL HISTORY:  Right knee arthroscopic procedure.    MEDICATIONS:  Please see medication reconciliation list.      ALLERGIES:  No known drug allergies.    FAMILY HISTORY:  Positive for hypertension.    SOCIAL HISTORY:  No tobacco, alcohol, or drug use.  Lives with her family.  Independent for basic activities of daily living.     REVIEW OF SYSTEMS:  Intense muscle pain both thighs, barely able to walk because of the pain.  Started strenuous exercise regimen on a stationary bike 2 days ago.  Patient reports no other trauma.  Other 12-point review of systems is negative.      PHYSICAL EXAMINATION:    GENERAL:  Alert and oriented to time, place, and person.  Mild to moderate distress.  VITAL SIGNS:  Temperature 98.0, pulse 72, respiratory rate 20, blood pressure 118/73, pulse ox 100% on room air.    HEENT:  Atraumatic.  Oropharynx clear.  Moist mucous membranes.  Ears, nose normal.  Eyes:  Anicteric sclerae.    NECK:  Supple.  No lymphadenopathy.  Trachea midline.  Full range of motion.  LUNGS:  Clear to auscultation bilaterally.  Normal  respiratory effort.    HEART:  Regular rate, rhythm.  S1 and S2 auscultated.  No murmur.   ABDOMEN:  Soft, nondistended.  No tenderness.  Positive bowel sounds.    EXTREMITIES:  No edema, clubbing, or cyanosis.  Tenderness to anterior compartment of both thigh muscle groups.  NEUROLOGIC:  Motor and sensory intact.    ASSESSMENT AND PLAN:  Rhabdomyolysis, post strenuous exercise.  Patient will be admitted to general medical floor.  Aggressive IV fluid management.  Monitor CBC, chemistry, electrolytes, kidney function, and CK level.  Nephrology consult.  Further recommendations to follow.    Dictated By Mirian Lawson MD  d: 03/18/2024 18:33:06  t: 03/18/2024 18:37:11  Job [id number]  FB/

## 2024-03-19 LAB
ANION GAP SERPL CALC-SCNC: 4 MMOL/L (ref 0–18)
BASOPHILS # BLD AUTO: 0.03 X10(3) UL (ref 0–0.2)
BASOPHILS NFR BLD AUTO: 0.4 %
BUN BLD-MCNC: 6 MG/DL (ref 9–23)
BUN/CREAT SERPL: 9.8 (ref 10–20)
CALCIUM BLD-MCNC: 8.9 MG/DL (ref 8.7–10.4)
CHLORIDE SERPL-SCNC: 111 MMOL/L (ref 98–112)
CK SERPL-CCNC: ABNORMAL U/L
CO2 SERPL-SCNC: 25 MMOL/L (ref 21–32)
CREAT BLD-MCNC: 0.61 MG/DL
DEPRECATED RDW RBC AUTO: 39.1 FL (ref 35.1–46.3)
EGFRCR SERPLBLD CKD-EPI 2021: 121 ML/MIN/1.73M2 (ref 60–?)
EOSINOPHIL # BLD AUTO: 0.32 X10(3) UL (ref 0–0.7)
EOSINOPHIL NFR BLD AUTO: 4.2 %
ERYTHROCYTE [DISTWIDTH] IN BLOOD BY AUTOMATED COUNT: 13.8 % (ref 11–15)
GLUCOSE BLD-MCNC: 98 MG/DL (ref 70–99)
HCT VFR BLD AUTO: 32.9 %
HGB BLD-MCNC: 11.1 G/DL
IMM GRANULOCYTES # BLD AUTO: 0.02 X10(3) UL (ref 0–1)
IMM GRANULOCYTES NFR BLD: 0.3 %
LYMPHOCYTES # BLD AUTO: 2.8 X10(3) UL (ref 1–4)
LYMPHOCYTES NFR BLD AUTO: 36.6 %
MCH RBC QN AUTO: 26.4 PG (ref 26–34)
MCHC RBC AUTO-ENTMCNC: 33.7 G/DL (ref 31–37)
MCV RBC AUTO: 78.3 FL
MONOCYTES # BLD AUTO: 0.55 X10(3) UL (ref 0.1–1)
MONOCYTES NFR BLD AUTO: 7.2 %
NEUTROPHILS # BLD AUTO: 3.93 X10 (3) UL (ref 1.5–7.7)
NEUTROPHILS # BLD AUTO: 3.93 X10(3) UL (ref 1.5–7.7)
NEUTROPHILS NFR BLD AUTO: 51.3 %
OSMOLALITY SERPL CALC.SUM OF ELEC: 288 MOSM/KG (ref 275–295)
PLATELET # BLD AUTO: 229 10(3)UL (ref 150–450)
POTASSIUM SERPL-SCNC: 4.1 MMOL/L (ref 3.5–5.1)
RBC # BLD AUTO: 4.2 X10(6)UL
SODIUM SERPL-SCNC: 140 MMOL/L (ref 136–145)
WBC # BLD AUTO: 7.7 X10(3) UL (ref 4–11)

## 2024-03-19 PROCEDURE — 99222 1ST HOSP IP/OBS MODERATE 55: CPT | Performed by: INTERNAL MEDICINE

## 2024-03-19 PROCEDURE — 99233 SBSQ HOSP IP/OBS HIGH 50: CPT | Performed by: HOSPITALIST

## 2024-03-19 NOTE — PHYSICAL THERAPY NOTE
PHYSICAL THERAPY EVALUATION - INPATIENT     Room Number: 558/558-A  Evaluation Date: 3/19/2024  Type of Evaluation: Initial   Physician Order: PT Eval and Treat    Presenting Problem: non-traumatic rhabdomyolysis  Co-Morbidities : R knee arthritis - need TKA  Reason for Therapy: Mobility Dysfunction and Discharge Planning    PHYSICAL THERAPY ASSESSMENT   Patient is a 33 year old female admitted 3/18/2024 for non-traumatic rhabdomyolysis after exercise.  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  Patient is currently functioning below baseline with bed mobility, transfers, gait, and stair negotiation.  Patient is requiring supervision as a result of the following impairments: decreased functional strength, decreased endurance/aerobic capacity, pain, and medical status.  Physical Therapy will continue to follow for duration of hospitalization.    Patient will benefit from continued skilled PT Services at discharge to promote functional independence in home.  Anticipate patient will return home with OP PT.    PLAN  PT Treatment Plan: Bed mobility;Body mechanics;Endurance;Energy conservation;Gait training;Family education;Strengthening;Stair training;Transfer training;Balance training  Rehab Potential : Good  Frequency (Obs): 3x/week    PHYSICAL THERAPY MEDICAL/SOCIAL HISTORY   History related to current admission: non-traumatic rhabdo      Problem List  Principal Problem:    Non-traumatic rhabdomyolysis      HOME SITUATION  Home Situation  Type of Home: House  Home Layout: Two level;Able to live on main level  Stairs to Enter : 0  Stairs to Bedroom: 10  Railing: Yes     SUBJECTIVE  \"I took breaks on the bike but it was still too much apparently.\"     PHYSICAL THERAPY EXAMINATION   OBJECTIVE  Precautions: Bed/chair alarm  Fall Risk: High fall risk    WEIGHT BEARING RESTRICTION  Weight Bearing Restriction: None                PAIN  ASSESSMENT  Ratin  Location: LE muscle aches       COGNITION  Overall Cognitive Status:  WFL - within functional limits    BALANCE  Static Sitting: Good  Dynamic Sitting: Fair +  Static Standing: Fair  Dynamic Standing: Fair -    AM-PAC '6-Clicks' INPATIENT SHORT FORM - BASIC MOBILITY  How much difficulty does the patient currently have...  Patient Difficulty: Turning over in bed (including adjusting bedclothes, sheets and blankets)?: None   Patient Difficulty: Sitting down on and standing up from a chair with arms (e.g., wheelchair, bedside commode, etc.): A Little   Patient Difficulty: Moving from lying on back to sitting on the side of the bed?: A Little   How much help from another person does the patient currently need...   Help from Another: Moving to and from a bed to a chair (including a wheelchair)?: A Little   Help from Another: Need to walk in hospital room?: A Little   Help from Another: Climbing 3-5 steps with a railing?: A Lot     AM-PAC Score:  Raw Score: 18   Approx Degree of Impairment: 46.58%   Standardized Score (AM-PAC Scale): 43.63   CMS Modifier (G-Code): CK    FUNCTIONAL ABILITY STATUS  Functional Mobility/Gait Assessment  Gait Assistance: Supervision;Contact guard assist (CGA without AD, SBA/supv with RW)  Distance (ft): 15' with no AD, 150' wiht RW  Assistive Device: Rolling walker  Pattern: Shuffle;R Circumduction;L Circumduction  Rolling: stand-by assist  Supine to Sit: stand-by assist  Sit to Supine: stand-by assist  Sit to Stand: stand-by assist    Exercise/Education Provided:  Bed mobility  Body mechanics  Functional activity tolerated  Gait training    The patient's Approx Degree of Impairment: 46.58% has been calculated based on documentation in the [facility] '6 clicks' Inpatient Basic Mobility Short Form.  Research supports that patients with this level of impairment may benefit from home with home care.  Final disposition will be made by interdisciplinary medical team.    Patient  End of Session: In bed;Needs met;Call light within reach;RN aware of session/findings;All patient questions and concerns addressed;Family present;Ice applied    CURRENT GOALS  Goals to be met by: 4/5  Patient Goal Patient's self-stated goal is: to go home    Goal #1 Patient is able to demonstrate supine - sit EOB @ level: independent     Goal #1   Current Status    Goal #2 Patient is able to demonstrate transfers Sit to/from Stand at assistance level: independent with none     Goal #2  Current Status    Goal #3 Patient is able to ambulate 500 feet with assist device: none at assistance level: independent   Goal #3   Current Status    Goal #4 Patient will negotiate 10 stairs/one curb w/ assistive device and supervision   Goal #4   Current Status    Goal #5 Patient to demonstrate independence with home activity/exercise instructions provided to patient in preparation for discharge.   Goal #5   Current Status    Goal #6    Goal #6  Current Status      Patient Evaluation Complexity Level:  History Moderate - 1 or 2 personal factors and/or co-morbidities   Examination of body systems Moderate - addressing a total of 3 or more elements   Clinical Presentation  Moderate - Evolving   Clinical Decision Making  Moderate Complexity     Gait Training: 15 minutes  Therapeutic Activity:  9 minutes

## 2024-03-19 NOTE — OCCUPATIONAL THERAPY NOTE
OCCUPATIONAL THERAPY EVALUATION - INPATIENT     Room Number: 558/558-A  Evaluation Date: 3/19/2024  Type of Evaluation: Initial  Presenting Problem: non traumatic rhabdo    Physician Order: IP Consult to Occupational Therapy  Reason for Therapy: ADL/IADL Dysfunction and Discharge Planning    OCCUPATIONAL THERAPY ASSESSMENT   Patient is a 33 year old female admitted 3/18/2024 for non traumatic rhabdomyolosis.  Prior to admission, patient was living w/ her  and 4 young children in a 2 story home. Pt reports being independent w/ adls, ambulation w/o ad and driving. Pt works as a pharmacy tech.  Patient is currently functioning below baseline with lower body dressing, bed mobility, transfers, and functional standing tolerance.  Patient is requiring minimal assist as a result of the following impairments: pain and stiffness ble . Anticipate pt will be able to return home w/ assist of family. No further OT services planned at this time.    PLAN  Patient has been evaluated and presents with no skilled Occupational Therapy needs  at this time.  Patient will be discharged from Occupational Therapy services. Please re-order if a new functional limitation presents during this admission.  OT Device Recommendations: Raised toilet seat    OCCUPATIONAL THERAPY MEDICAL/SOCIAL HISTORY   Problem List  Principal Problem:    Non-traumatic rhabdomyolysis    HOME SITUATION  Type of Home: House  Home Layout: Two level; Able to live on main level  Lives With: Spouse (young children)  Toilet and Equipment: Standard height toilet  Other Equipment: None  Occupation/Status: -- (pharmacy tech)  Drives: Yes  Patient Regularly Uses: None    Stairs in Home: no stairs to enter;10 stairs w/ a landing mid way to reach bedroom. Pt will stay on main level  Use of Assistive Device(s): none    Prior Level of [place]: Prior to admission, patient was living w/ her  and 4 young children in a 2 story home. Pt reports being independent w/  adls, ambulation w/o ad and driving. Pt works as a pharmacy tech.    SUBJECTIVE  \"This is better then yesterday\"    OCCUPATIONAL THERAPY EXAMINATION    OBJECTIVE  Precautions: Bed/chair alarm  Fall Risk: High fall risk    WEIGHT BEARING RESTRICTION     PAIN ASSESSMENT  Ratin  Location: -- (ble)  Management Techniques: Activity promotion; Relaxation; Repositioning; Ice    ACTIVITY TOLERANCE  Limited by pain    O2 SATURATIONS  Activity on room air    SENSATION  Tingling bilateral feet    Behavioral/Emotional/Social: pleasant,cooperative    RANGE OF MOTION   Upper extremity ROM is within functional limits     STRENGTH ASSESSMENT  Upper extremity strength is within functional limits     ACTIVITIES OF DAILY LIVING ASSESSMENT  AM-PAC ‘6-Clicks’ Inpatient Daily Activity Short Form  How much help from another person does the patient currently need…  -   Putting on and taking off regular lower body clothing?: A Little  -   Bathing (including washing, rinsing, drying)?: A Little  -   Toileting, which includes using toilet, bedpan or urinal? : A Little  -   Putting on and taking off regular upper body clothing?: None  -   Taking care of personal grooming such as brushing teeth?: None  -   Eating meals?: None    AM-PAC Score:  Score: 21  Approx Degree of Impairment: 32.79%  Standardized Score (AM-PAC Scale): 44.27  CMS Modifier (G-Code): CJ    FUNCTIONAL ADL ASSESSMENT  Eating: independent  Grooming: independent  UB Dressing: independent  LB Dressing: min assist  Toileting: independent    Skilled Therapy Provided: RN contacted prior to start of care. Treatment coordinated w/ PT. Pt agreeable to participation in therapy.Pt received in bed, alert and oriented x 4;spouse and 2 young sons at bedside. On initial contact pt transferred supine<>sit at eob w/ supervision. Pt performing sit<>stand transfers w/ supervision. Pt ambulating in the [place] w/ rw and supervision;fareed slow w/ shuffling gait, but no lob noted. Le dressing  and pacing strategies discussed. Pt currently requires limited assist to manage le dressing tasks from long sit. Importance of oob activity and frequent position changes stressed. Pt currently unable to identify potential  OT areas of concern in anticipation of discharge. Pt presenting w/ sound judgement and good insight into limitations. At end of session pt returned to bed w/ supervision and left w/ all need sin reach;family at bedside. No further OT contact planned. RN aware of pt's status and performance in therapy      EDUCATION PROVIDED  Patient: Role of Occupational Therapy; Plan of Care; Functional Transfer Techniques; Fall Prevention; Compensatory ADL Techniques  Patient's Response to Education: Verbalized Understanding; Returned Demonstration    The patient's Approx Degree of Impairment: 32.79% has been calculated based on documentation in the [facility] '6 clicks' Inpatient Daily Activity Short Form.  Research supports that patients with this level of impairment may benefit from return to home.  Final disposition will be made by interdisciplinary medical team.    Patient End of Session: In bed;Needs met;Call light within reach;RN aware of session/findings;All patient questions and concerns addressed;Ice applied      Patient Evaluation Complexity Level:   Occupational Profile/Medical History LOW - Brief history including review of medical or therapy records    Specific performance deficits impacting engagement in ADL/IADL LOW  1 - 3 performance deficits    Client Assessment/Performance Deficits MODERATE - Comorbidities and min to mod modifications of tasks    Clinical Decision Making LOW - Analysis of occupational profile, problem-focused assessments, limited treatment options    Overall Complexity LOW     Therapeutic Activity: 23 minutes

## 2024-03-19 NOTE — PLAN OF CARE
No acute changes. Patient resting comfortably, call light within reach. Patient calls appropriately. Family at bedside.    Problem: Patient Centered Care  Goal: Patient preferences are identified and integrated in the patient's plan of care  Description: Interventions:  - What would you like us to know as we care for you? Home with family   - Provide timely, complete, and accurate information to patient/family  - Incorporate patient and family knowledge, values, beliefs, and cultural backgrounds into the planning and delivery of care  - Encourage patient/family to participate in care and decision-making at the level they choose  - Honor patient and family perspectives and choices  Outcome: Progressing     Problem: Patient/Family Goals  Goal: Patient/Family Long Term Goal  Description: Patient's Long Term Goal: Discharge home     Interventions:  - IV fluids   -Pain management   - See additional Care Plan goals for specific interventions  Outcome: Progressing  Goal: Patient/Family Short Term Goal  Description: Patient's Short Term Goal: Feel better    Interventions:   - Pain management   -Monitor vital signs  - Monitor appropriate labs  - Administer medications per order  - Pain management as needed  - Follow MD orders  - Diagnostics per orders  - Update / inform patient and family on plan of care  - Discharge planning    - See additional Care Plan goals for specific interventions  Outcome: Progressing     Problem: MUSCULOSKELETAL - ADULT  Goal: Return mobility to safest level of function  Description: INTERVENTIONS:  - Assess patient stability and activity tolerance for standing, transferring and ambulating w/ or w/o assistive devices  - Assist with transfers and ambulation using safe patient handling equipment as needed  - Ensure adequate protection for wounds/incisions during mobilization  - Obtain PT/OT consults as needed  - Advance activity as appropriate  - Communicate ordered activity level and limitations with  patient/family  Outcome: Progressing     Problem: Impaired Functional Mobility  Goal: Achieve highest/safest level of mobility/gait  Description: Interventions:  - Assess patient's functional ability and stability  - Promote increasing activity/tolerance for mobility and gait  - Educate and engage patient/family in tolerated activity level and precautions  Outcome: Progressing     Problem: PAIN - ADULT  Goal: Verbalizes/displays adequate comfort level or patient's stated pain goal  Description: INTERVENTIONS:  - Encourage pt to monitor pain and request assistance  - Assess pain using appropriate pain scale  - Administer analgesics based on type and severity of pain and evaluate response  - Implement non-pharmacological measures as appropriate and evaluate response  - Consider cultural and social influences on pain and pain management  - Manage/alleviate anxiety  - Utilize distraction and/or relaxation techniques  - Monitor for opioid side effects  - Notify MD/LIP if interventions unsuccessful or patient reports new pain  - Anticipate increased pain with activity and pre-medicate as appropriate  Outcome: Progressing

## 2024-03-19 NOTE — PROGRESS NOTES
[facility]  part of [facility]    Progress Note    Jesse Ferrer Patient Status:  Inpatient    1990 MRN [id number]   Location [facility] 5SW/SE Attending Yessica Garrett MD   Hosp Day # 1 PCP Jamie Díaz DO     Chief Complaint: leg pain    SUBJECTIVE:    No acute events overnight.  Patient denies chest pain, sob.  No fevers/chills.  No n/v/abd pain.  Feeling a little better but her legs are still sore.     10 ROS completed and otherwise negative.    OBJECTIVE:  Vital signs in last 24 hours:  /71 (BP Location: Left arm)   Pulse 98   Temp 98.4 °F (36.9 °C) (Oral)   Resp 18   Ht 5' 6\" (1.676 m)   Wt 244 lb 12.8 oz (111 kg)   SpO2 95%   BMI 39.51 kg/m²     Intake/Output:    Intake/Output Summary (Last 24 hours) at 3/19/2024 1434  Last data filed at 3/18/2024 1939  Gross per 24 hour   Intake 2000 ml   Output --   Net 2000 ml       Wt Readings from Last 3 Encounters:   24 244 lb 12.8 oz (111 kg)   21 210 lb (95.3 kg)   21 227 lb 3.2 oz (103.1 kg)       Exam     Gen: No acute distress  Pulm: Lungs clear, normal respiratory effort  CV: Heart with regular rate and rhythm  Abd: Abdomen soft, nontender, bowel sounds present  Neuro: No acute focal deficits  MSK: Full range of motion in extremities  Skin: Warm and dry  Psych: Normal affect  Ext: no c/c/e; thighs are ttp    Data Review:     Labs:   Lab Results   Component Value Date    WBC 7.7 2024    HGB 11.1 2024    HCT 32.9 2024    .0 2024    CREATSERUM 0.61 2024    BUN 6 2024     2024    K 4.1 2024     2024    CO2 25.0 2024    GLU 98 2024    CA 8.9 2024    ALB 4.3 2024    ALKPHO 80 2024    BILT 0.4 2024    TP 7.7 2024     2024     2024    CK 31,569 2024       Imaging:   No results found.    Meds:   Current Facility-Administered Medications    Medication Dose Route Frequency    sodium chloride 0.9% infusion   Intravenous Continuous    heparin (Porcine) 5000 UNIT/ML injection 5,000 Units  5,000 Units Subcutaneous 2 times per day    acetaminophen (Tylenol Extra Strength) tab 500 mg  500 mg Oral Q4H PRN    acetaminophen (Tylenol) tab 650 mg  650 mg Oral Q4H PRN    Or    HYDROcodone-acetaminophen (Norco) 5-325 MG per tab 1 tablet  1 tablet Oral Q4H PRN    Or    HYDROcodone-acetaminophen (Norco) 5-325 MG per tab 2 tablet  2 tablet Oral Q4H PRN    ondansetron (Zofran) 4 MG/2ML injection 4 mg  4 mg Intravenous Q6H PRN    prochlorperazine (Compazine) 10 MG/2ML injection 5 mg  5 mg Intravenous Q8H PRN    temazepam (Restoril) cap 15 mg  15 mg Oral Nightly PRN         Assessment  Patient Active Problem List   Diagnosis    Radiation exposure affecting pregnancy in first trimester ([facility])    Obesity affecting pregnancy, antepartum ([facility])    Dichorionic diamniotic twin pregnancy, antepartum ([facility])    Migraine without aura and without status migrainosus, not intractable    Supervision of normal pregnancy ([facility])    Antepartum anemia ([facility])    False labor before 37 completed weeks of gestation in third trimester ([facility])    Dichorionic diamniotic twin pregnancy in third trimester ([facility])    Non-traumatic rhabdomyolysis       Plan:     Acute rhabdomyolyosis  - ck slightly improved   - will use norco for pain, avoid NSAIDs  - renal consulted  - cont aggressive IVFs  - trend bmp to make sure no renal failure develops    Morbid obesity  - BMI 40  - diet and exercise when stable    Prophylaxis:   DVT with SCDs    Dispo: pending clinical course    Global A/P  - reviewed labs and vitals from today  - reviewed notes of the day  - cbc, bmp, mag ordered for tomorrow  - discussed need to stay in the hospital today due to rhabdo  - cont IV fluids  - discussed with patient/RN and care team    MDM: high level      Yessica Garrett MD  3/19/2024  2:34 PM

## 2024-03-19 NOTE — PLAN OF CARE
Problem: Patient Centered Care  Goal: Patient preferences are identified and integrated in the patient's plan of care  Description: Interventions:  - What would you like us to know as we care for you? Home with family   - Provide timely, complete, and accurate information to patient/family  - Incorporate patient and family knowledge, values, beliefs, and cultural backgrounds into the planning and delivery of care  - Encourage patient/family to participate in care and decision-making at the level they choose  - Honor patient and family perspectives and choices  Outcome: Progressing     Problem: Patient/Family Goals  Goal: Patient/Family Long Term Goal  Description: Patient's Long Term Goal: Discharge home     Interventions:  - IV fluids   -Pain management   - See additional Care Plan goals for specific interventions  Outcome: Progressing  Goal: Patient/Family Short Term Goal  Description: Patient's Short Term Goal: Feel better    Interventions:   - Pain management   -Monitor vital signs  - Monitor appropriate labs  - Administer medications per order  - Pain management as needed  - Follow MD orders  - Diagnostics per orders  - Update / inform patient and family on plan of care  - Discharge planning    - See additional Care Plan goals for specific interventions  Outcome: Progressing     Problem: MUSCULOSKELETAL - ADULT  Goal: Return mobility to safest level of function  Description: INTERVENTIONS:  - Assess patient stability and activity tolerance for standing, transferring and ambulating w/ or w/o assistive devices  - Assist with transfers and ambulation using safe patient handling equipment as needed  - Ensure adequate protection for wounds/incisions during mobilization  - Obtain PT/OT consults as needed  - Advance activity as appropriate  - Communicate ordered activity level and limitations with patient/family  Outcome: Progressing     Problem: Impaired Functional Mobility  Goal: Achieve highest/safest level of  mobility/gait  Description: Interventions:  - Assess patient's functional ability and stability  - Promote increasing activity/tolerance for mobility and gait  - Educate and engage patient/family in tolerated activity level and precautions  Outcome: Progressing     Problem: PAIN - ADULT  Goal: Verbalizes/displays adequate comfort level or patient's stated pain goal  Description: INTERVENTIONS:  - Encourage pt to monitor pain and request assistance  - Assess pain using appropriate pain scale  - Administer analgesics based on type and severity of pain and evaluate response  - Implement non-pharmacological measures as appropriate and evaluate response  - Consider cultural and social influences on pain and pain management  - Manage/alleviate anxiety  - Utilize distraction and/or relaxation techniques  - Monitor for opioid side effects  - Notify MD/LIP if interventions unsuccessful or patient reports new pain  - Anticipate increased pain with activity and pre-medicate as appropriate  Outcome: Progressing     Admission from ED. Alert and oriented x4. PRN norco for pain. IV fluids per order. Monitoring vital signs- stable at this time.  Safety and fall precautions maintained- bed locked in lowest position, call light within reach.

## 2024-03-19 NOTE — ED QUICK NOTES
Orders for admission, patient is aware of plan and ready to go upstairs. Any questions, please call ED RN max at extension [phone].     Patient Covid vaccination status: Unvaccinated     COVID Test Ordered in ED: None    COVID Suspicion at Admission: N/A    Running Infusions:    sodium chloride          Mental Status/LOC at time of transport: aox3    Other pertinent information:   CIWA score: N/A   NIH score:  N/A

## 2024-03-19 NOTE — CONSULTS
[facility]  part of [facility]    Report of Consultation    Jesse Ferrer Patient Status:  Inpatient    1990 MRN [id number]   Location [facility] 5SW/SE Attending Yessica Garrett MD   Hosp Day # 1 PCP Jamie Díaz DO     Date of Admission:  3/18/2024  Date of Consult:  3/19/2024   Reason for Consultation:   Rhabdomyolysis    History of Present Illness:   Patient is a 33 year old female who was admitted to the hospital for Non-traumatic rhabdomyolysis:    33-year-old female with no significant past medical history started working out. started with bike and later that night she started having bilateral lower extremity pain that progressively got worse and hence presented to the ER.  Also noted dark urine.  Her CK was 33,600 and her UA had 3+ blood.  She has started on IV fluids.  Pain persists but her urine has become less dark      Past Medical History  Past Medical History:   Diagnosis Date    Human papilloma virus infection 2020    high risk/coploscopy     Obesity     Ovarian cyst        Past Surgical History  History reviewed. No pertinent surgical history.    Family History  Family History   Problem Relation Age of Onset    Hypertension Father        Social History  Social History     Socioeconomic History    Marital status: Life Partner   Tobacco Use    Smoking status: Never    Smokeless tobacco: Never   Vaping Use    Vaping Use: Never used   Substance and Sexual Activity    Alcohol use: Never    Drug use: Never     Social Determinants of Health     Food Insecurity: No Food Insecurity (3/18/2024)    Food Insecurity     Food Insecurity: Never true   Transportation Needs: No Transportation Needs (3/18/2024)    Transportation Needs     Lack of Transportation: No   Housing Stability: Low Risk  (3/18/2024)    Housing Stability     Housing Instability: No       Current Medications:  Current Facility-Administered Medications   Medication Dose Route Frequency    sodium  chloride 0.9% infusion   Intravenous Continuous    heparin (Porcine) 5000 UNIT/ML injection 5,000 Units  5,000 Units Subcutaneous 2 times per day    acetaminophen (Tylenol Extra Strength) tab 500 mg  500 mg Oral Q4H PRN    acetaminophen (Tylenol) tab 650 mg  650 mg Oral Q4H PRN    Or    HYDROcodone-acetaminophen (Norco) 5-325 MG per tab 1 tablet  1 tablet Oral Q4H PRN    Or    HYDROcodone-acetaminophen (Norco) 5-325 MG per tab 2 tablet  2 tablet Oral Q4H PRN    ondansetron (Zofran) 4 MG/2ML injection 4 mg  4 mg Intravenous Q6H PRN    prochlorperazine (Compazine) 10 MG/2ML injection 5 mg  5 mg Intravenous Q8H PRN    temazepam (Restoril) cap 15 mg  15 mg Oral Nightly PRN     Medications Prior to Admission   Medication Sig    Misc. Devices (BREAST PUMP) Does not apply Misc DOUBLE ELECTRIC BREAST PUMP EQUIVALENT TO MEDELA PUMP IN STYLE (Patient not taking: Reported on 11/2/2021)    Prenatal Vit-Fe Fumarate-FA ( PRENATAL VITAMINS) 28-0.8 MG Oral Tab Take by mouth. (Patient not taking: Reported on 11/2/2021)       Allergies  No Known Allergies    Review of Systems:   GENERAL HEALTH: feels well otherwise  SKIN: denies any unusual skin lesions or rashes  RESPIRATORY: denies shortness of breath with exertion, no cough, no hemoptysis  CARDIOVASCULAR: denies chest pain on exertion, no palpitations  :  Denies hematuria, dysuria, foamy urine  GI: denies abdominal pain and denies heartburn, no nausea/vomiting/diarrhea  EXT: no edema  NEURO: denies headaches      A comprehensive 12 point review of systems was completed.  Pertinent positives as above and all the rest were negative.     Physical Exam:   /71 (BP Location: Left arm)   Pulse 98   Temp 98.4 °F (36.9 °C) (Oral)   Resp 18   Ht 5' 6\" (1.676 m)   Wt 244 lb 12.8 oz (111 kg)   SpO2 95%   BMI 39.51 kg/m²      Intake/Output Summary (Last 24 hours) at 3/19/2024 1004  Last data filed at 3/18/2024 1939  Gross per 24 hour   Intake 2000 ml   Output --   Net 2000 ml      Wt Readings from Last 1 Encounters:   03/18/24 244 lb 12.8 oz (111 kg)       Exam  GENERAL: well developed, well nourished,in no apparent distress  SKIN: no rashes  HEENT: no scleral icterus, moist mucus membranes  NECK: supple,no adenopathy,no bruits  LUNGS: clear to auscultation without crackles or wheezes  CARDIO: RRR without murmur  GI: good BS's,no masses, HSM or tenderness, soft, non-distended, no audible bruits  EXTREMITIES: no cyanosis, clubbing or edema  NEURO: CN grossly intact, A+O x3  Access      Results:     Laboratory Data:  Recent Labs   Lab 03/18/24  1628 03/19/24  0611   RBC 4.78 4.20   HGB 12.7 11.1*   HCT 37.0 32.9*   MCV 77.4* 78.3*   MCH 26.6 26.4   MCHC 34.3 33.7   RDW 13.9 13.8   NEPRELIM 4.60 3.93   WBC 7.3 7.7   .0 229.0         Recent Labs   Lab 03/18/24  1628 03/19/24  0611   GLU 96 98   BUN 6* 6*   CREATSERUM 0.63 0.61   CA 9.7 8.9    140   K 3.8 4.1    111   CO2 25.0 25.0        Imaging:  No results found.        Impression/Plan:     Impression:  Rhabdomyolyosis, her CK is 33,600, fortunately no JOSH.  Aggressive IV fluids.  Trend BMP, avoid NSAIDs.  No Toradol.     Plan  BMP in a.m.  Continue normal saline    Thank you very much for allowing me to participate in the care of your patient.  If you have any questions, please do not hesitate to contact me.     Dina Pollard MD  3/19/2024

## 2024-03-20 LAB
ANION GAP SERPL CALC-SCNC: 3 MMOL/L (ref 0–18)
BASOPHILS # BLD AUTO: 0.02 X10(3) UL (ref 0–0.2)
BASOPHILS NFR BLD AUTO: 0.3 %
BUN BLD-MCNC: <5 MG/DL (ref 9–23)
CALCIUM BLD-MCNC: 9.1 MG/DL (ref 8.7–10.4)
CHLORIDE SERPL-SCNC: 111 MMOL/L (ref 98–112)
CK SERPL-CCNC: ABNORMAL U/L
CO2 SERPL-SCNC: 27 MMOL/L (ref 21–32)
CREAT BLD-MCNC: 0.62 MG/DL
DEPRECATED RDW RBC AUTO: 38.8 FL (ref 35.1–46.3)
EGFRCR SERPLBLD CKD-EPI 2021: 121 ML/MIN/1.73M2 (ref 60–?)
EOSINOPHIL # BLD AUTO: 0.31 X10(3) UL (ref 0–0.7)
EOSINOPHIL NFR BLD AUTO: 4.7 %
ERYTHROCYTE [DISTWIDTH] IN BLOOD BY AUTOMATED COUNT: 13.6 % (ref 11–15)
GLUCOSE BLD-MCNC: 95 MG/DL (ref 70–99)
HCT VFR BLD AUTO: 31.7 %
HGB BLD-MCNC: 10.5 G/DL
IMM GRANULOCYTES # BLD AUTO: 0.01 X10(3) UL (ref 0–1)
IMM GRANULOCYTES NFR BLD: 0.2 %
LYMPHOCYTES # BLD AUTO: 2.73 X10(3) UL (ref 1–4)
LYMPHOCYTES NFR BLD AUTO: 41.2 %
MAGNESIUM SERPL-MCNC: 1.9 MG/DL (ref 1.6–2.6)
MCH RBC QN AUTO: 25.8 PG (ref 26–34)
MCHC RBC AUTO-ENTMCNC: 33.1 G/DL (ref 31–37)
MCV RBC AUTO: 77.9 FL
MONOCYTES # BLD AUTO: 0.46 X10(3) UL (ref 0.1–1)
MONOCYTES NFR BLD AUTO: 6.9 %
NEUTROPHILS # BLD AUTO: 3.1 X10 (3) UL (ref 1.5–7.7)
NEUTROPHILS # BLD AUTO: 3.1 X10(3) UL (ref 1.5–7.7)
NEUTROPHILS NFR BLD AUTO: 46.7 %
PLATELET # BLD AUTO: 228 10(3)UL (ref 150–450)
POTASSIUM SERPL-SCNC: 4.3 MMOL/L (ref 3.5–5.1)
RBC # BLD AUTO: 4.07 X10(6)UL
SODIUM SERPL-SCNC: 141 MMOL/L (ref 136–145)
WBC # BLD AUTO: 6.6 X10(3) UL (ref 4–11)

## 2024-03-20 PROCEDURE — 99232 SBSQ HOSP IP/OBS MODERATE 35: CPT | Performed by: INTERNAL MEDICINE

## 2024-03-20 PROCEDURE — 99233 SBSQ HOSP IP/OBS HIGH 50: CPT | Performed by: HOSPITALIST

## 2024-03-20 NOTE — PLAN OF CARE
Problem: Patient Centered Care  Goal: Patient preferences are identified and integrated in the patient's plan of care  Description: Interventions:  - What would you like us to know as we care for you? Home with family   - Provide timely, complete, and accurate information to patient/family  - Incorporate patient and family knowledge, values, beliefs, and cultural backgrounds into the planning and delivery of care  - Encourage patient/family to participate in care and decision-making at the level they choose  - Honor patient and family perspectives and choices  Outcome: Progressing     Problem: MUSCULOSKELETAL - ADULT  Goal: Return mobility to safest level of function  Description: INTERVENTIONS:  - Assess patient stability and activity tolerance for standing, transferring and ambulating w/ or w/o assistive devices  - Assist with transfers and ambulation using safe patient handling equipment as needed  - Ensure adequate protection for wounds/incisions during mobilization  - Obtain PT/OT consults as needed  - Advance activity as appropriate  - Communicate ordered activity level and limitations with patient/family  Outcome: Progressing     Problem: Impaired Functional Mobility  Goal: Achieve highest/safest level of mobility/gait  Description: Interventions:  - Assess patient's functional ability and stability  - Promote increasing activity/tolerance for mobility and gait  - Educate and engage patient/family in tolerated activity level and precautions  - Recommend use of  RW for transfers and ambulation  Outcome: Progressing     Problem: METABOLIC/FLUID AND ELECTROLYTES - ADULT  Goal: Hemodynamic stability and optimal renal function maintained  Description: INTERVENTIONS:  - Monitor labs and assess for signs and symptoms of volume excess or deficit  - Monitor intake, output and patient weight  - Monitor urine specific gravity, serum osmolarity and serum sodium as indicated or ordered  - Monitor response to  interventions for patient's volume status, including labs, urine output, blood pressure (other measures as available)  - Encourage oral intake as appropriate  - Instruct patient on fluid and nutrition restrictions as appropriate  Outcome: Progressing     Problem: SKIN/TISSUE INTEGRITY - ADULT  Goal: Skin integrity remains intact  Description: INTERVENTIONS  - Assess and document risk factors for pressure ulcer development  - Assess and document skin integrity  - Monitor for areas of redness and/or skin breakdown  - Initiate interventions, skin care algorithm/standards of care as needed  Outcome: Progressing     Problem: PAIN - ADULT  Goal: Verbalizes/displays adequate comfort level or patient's stated pain goal  Description: INTERVENTIONS:  - Encourage pt to monitor pain and request assistance  - Assess pain using appropriate pain scale  - Administer analgesics based on type and severity of pain and evaluate response  - Implement non-pharmacological measures as appropriate and evaluate response  - Consider cultural and social influences on pain and pain management  - Manage/alleviate anxiety  - Utilize distraction and/or relaxation techniques  - Monitor for opioid side effects  - Notify MD/LIP if interventions unsuccessful or patient reports new pain  - Anticipate increased pain with activity and pre-medicate as appropriate  Outcome: Progressing     Problem: SAFETY ADULT - FALL  Goal: Free from fall injury  Description: INTERVENTIONS:  - Assess pt frequently for physical needs  - Identify cognitive and physical deficits and behaviors that affect risk of falls.  - [place] fall precautions as indicated by assessment.  - Educate pt/family on patient safety including physical limitations  - Instruct pt to call for assistance with activity based on assessment  - Modify environment to reduce risk of injury  - Provide assistive devices as appropriate  - Consider OT/PT consult to assist with strengthening/mobility  -  Encourage toileting schedule  Outcome: Progressing     Problem: DISCHARGE PLANNING  Goal: Discharge to home or other facility with appropriate resources  Description: INTERVENTIONS:  - Identify barriers to discharge w/pt and caregiver  - Include patient/family/discharge partner in discharge planning  - Arrange for needed discharge resources and transportation as appropriate  - Identify discharge learning needs (meds, wound care, etc)  - Arrange for interpreters to assist at discharge as needed  - Consider post-discharge preferences of patient/family/discharge partner  - Complete POLST form as appropriate  - Assess patient's ability to be responsible for managing their own health  - Refer to Case Management Department for coordinating discharge planning if the patient needs post-hospital services based on physician/LIP order or complex needs related to functional status, cognitive ability or social support system  Outcome: Progressing  Patient A/O x4. CK elevated today to 47,650-MD aware, IVF increased to 150/hr. Pain controlled with norco.Labs ordered for AM.

## 2024-03-20 NOTE — PROGRESS NOTES
[facility]  part of [facility]    Progress Note    Jesse Ferrer Patient Status:  Inpatient    1990 MRN [id number]   Location [facility] 5SW/SE Attending Yessica Garrett MD   Hosp Day # 2 PCP Jamie Díaz DO     Chief Complaint: leg pain    SUBJECTIVE:    No acute events overnight.  Patient denies chest pain, sob.  No fevers/chills.  No n/v/abd pain.    Still has muscle soreness but overall better    10 ROS completed and otherwise negative.    OBJECTIVE:  Vital signs in last 24 hours:  /69 (BP Location: Left arm)   Pulse 82   Temp 98.8 °F (37.1 °C) (Oral)   Resp 18   Ht 5' 6\" (1.676 m)   Wt 244 lb 12.8 oz (111 kg)   SpO2 97%   BMI 39.51 kg/m²     Intake/Output:    Intake/Output Summary (Last 24 hours) at 3/20/2024 1742  Last data filed at 3/20/2024 0600  Gross per 24 hour   Intake 2620 ml   Output --   Net 2620 ml       Wt Readings from Last 3 Encounters:   24 244 lb 12.8 oz (111 kg)   21 210 lb (95.3 kg)   21 227 lb 3.2 oz (103.1 kg)       Exam     Gen: No acute distress  Pulm: Lungs clear, normal respiratory effort  CV: Heart with regular rate and rhythm  Abd: Abdomen soft, nontender, bowel sounds present  Neuro: No acute focal deficits  MSK: Full range of motion in extremities  Skin: Warm and dry  Psych: Normal affect  Ext: no c/c/e; thighs are ttp    Data Review:     Labs:   Lab Results   Component Value Date    WBC 6.6 2024    HGB 10.5 2024    HCT 31.7 2024    .0 2024    CREATSERUM 0.62 2024    BUN <5 2024     2024    K 4.3 2024     2024    CO2 27.0 2024    GLU 95 2024    CA 9.1 2024    MG 1.9 2024    CK 47,650 2024       Imaging:   No results found.    Meds:           Assessment  Patient Active Problem List   Diagnosis    Radiation exposure affecting pregnancy in first trimester (HCC)    Obesity affecting pregnancy, antepartum  (HCC)    Dichorionic diamniotic twin pregnancy, antepartum (HCC)    Migraine without aura and without status migrainosus, not intractable    Supervision of normal pregnancy (HCC)    Antepartum anemia (HCC)    False labor before 37 completed weeks of gestation in third trimester (HCC)    Dichorionic diamniotic twin pregnancy in third trimester (HCC)    Non-traumatic rhabdomyolysis       Plan:     Acute rhabdomyolyosis  - ck slightly trended up  - will use norco for pain, avoid NSAIDs  -CK was 33,600 on initial evaluation initially improved but now over 47K   - renal consulted  - cont aggressive IVFs, increase IV fluid to 150 an hour  - trend bmp to make sure no renal failure develops  -Repeat labs in the morning    Elevated transaminases  -Likely related to acute rhabdo  -Outpatient follow-up to document resolution or further outpatient workup    Morbid obesity  - BMI 40  - diet and exercise when stable    Prophylaxis:   DVT with SCDs    Dispo: pending clinical course    Global A/P  - reviewed labs and vitals from today  - reviewed notes of the day  - cbc, bmp, mag ordered for tomorrow  - discussed need to stay in the hospital today due to rhabdo  - cont IV fluids  - discussed with patient/RN and care team    MDM: high level      HAWA JUAREZ MD

## 2024-03-20 NOTE — PAYOR COMM NOTE
--------------  ADMISSION REVIEW     Payor: [facility]  Subscriber #:  [id number]  Authorization Number: [id number]    Admit date: 3/18/24  Admit time:  8:28 PM       REVIEW DOCUMENTATION:    Patient Seen in: [facility] Emergency Department      History     Chief Complaint   Patient presents with    Pain     Stated Complaint: leg pain    Subjective:   34yo/f w no chronic medical problems reports with bilateral anterior thigh pain. Reports very sensitive, pain with walking. She rode a bike/exercised around 0800 on Saturday. Yesterday (Sunday) pain started slowly and worsened gradually. No chest pain, dyspnea. No swelling. No weakness. No posterior leg pain. No edema. Does endorse dark urine.         Review of Systems   All other systems reviewed and are negative.      Positive for stated complaint: leg pain  Other systems are as noted in HPI.  Constitutional and vital signs reviewed.      All other systems reviewed and negative except as noted above.    Physical Exam     ED Triage Vitals [03/18/24 1527]   /84   Pulse 85   Resp 20   Temp 97.6 °F (36.4 °C)   Temp src Temporal   SpO2 100 %   O2 Device None (Room air)     Current:/71 (BP Location: Left arm)   Pulse 98   Temp 98.4 °F (36.9 °C) (Oral)   Resp 18   Ht 167.6 cm (5' 6\")   Wt 111 kg   SpO2 95%   BMI 39.51 kg/m²       Physical Exam    Neurological:      General: No focal deficit present.      Mental Status: She is alert and oriented to person, place, and time.      GCS: GCS eye subscore is 4. GCS verbal subscore is 5. GCS motor subscore is 6.      Cranial Nerves: No cranial nerve deficit.      Gait: Gait normal.       ED Course     Labs Reviewed   COMP METABOLIC PANEL (14) - Abnormal; Notable for the following components:       Result Value    BUN 6 (*)     BUN/CREA Ratio 9.5 (*)      (*)      (*)     All other components within normal limits   CK CREATINE KINASE (NOT CREATININE) - Abnormal;  Notable for the following components:    CK 33,607 (*)     All other components within normal limits   URINALYSIS, ROUTINE - Abnormal; Notable for the following components:    Clarity Urine Turbid (*)     Blood Urine 3+ (*)     Protein Urine 50 (*)     Leukocyte Esterase Urine 250 (*)     WBC Urine 11-20 (*)     RBC Urine 6-10 (*)     Bacteria Urine Rare (*)     Squamous Epi. Cells Moderate (*)     All other components within normal limits   BASIC METABOLIC PANEL (8) - Abnormal; Notable for the following components:    BUN 6 (*)     BUN/CREA Ratio 9.8 (*)     All other components within normal limits   CK CREATINE KINASE (NOT CREATININE) - Abnormal; Notable for the following components:    CK 31,569 (*)     All other components within normal limits   CBC W/ DIFFERENTIAL - Abnormal; Notable for the following components:    MCV 77.4 (*)     All other components within normal limits   CBC W/ DIFFERENTIAL - Abnormal; Notable for the following components:    HGB 11.1 (*)     HCT 32.9 (*)     MCV 78.3 (*)      MDM        Medical Decision Making  32yo/f w hx and exam as stated, leg pain    Non toxic, stable appearing  CK 33,000    Problems Addressed:  Non-traumatic rhabdomyolysis: acute illness or injury      Disposition and Plan     Clinical Impression:  1. Non-traumatic rhabdomyolysis         Disposition:  Admit  3/18/2024  6:06 pm      HISTORY AND PHYSICAL EXAMINATION    JULIO THIBODEAUX [id number]   YOB: 1990 [id number]       [facility]    PATIENT'S NAME: THIBODEAUX, JULIO   ATTENDING PHYSICIAN: Mirian Lawson MD   PATIENT ACCOUNT#:   [id number]    LOCATION:  [address]  MEDICAL RECORD #:   [id number]       YOB: 1990  ADMISSION DATE:       03/18/2024    HISTORY AND PHYSICAL EXAMINATION    CHIEF COMPLAINT:  Rhabdomyolysis.    HISTORY OF PRESENT ILLNESS:  The patient is a 33-year-old  female who started bike exercise regimen 2 days ago and yesterday  started developing extreme pain in her both thigh muscle areas and today pain got worse, and she decided to come into the emergency department for evaluation.  CBC and chemistry were unremarkable.  Urinalysis showed mild leukocyte esterase 3+ blood and CK was 33,600.  Started on aggressive IV fluids, and she will be admitted to the hospital for further management.    PAST MEDICAL HISTORY:  None.    PAST SURGICAL HISTORY:  Right knee arthroscopic procedure.    MEDICATIONS:  Please see medication reconciliation list.      ALLERGIES:  No known drug allergies.    FAMILY HISTORY:  Positive for hypertension.    SOCIAL HISTORY:  No tobacco, alcohol, or drug use.  Lives with her family.  Independent for basic activities of daily living.     REVIEW OF SYSTEMS:  Intense muscle pain both thighs, barely able to walk because of the pain.  Started strenuous exercise regimen on a stationary bike 2 days ago.  Patient reports no other trauma.  Other 12-point review of systems is negative.      PHYSICAL EXAMINATION:    GENERAL:  Alert and oriented to time, place, and person.  Mild to moderate distress.  VITAL SIGNS:  Temperature 98.0, pulse 72, respiratory rate 20, blood pressure 118/73, pulse ox 100% on room air.    HEENT:  Atraumatic.  Oropharynx clear.  Moist mucous membranes.  Ears, nose normal.  Eyes:  Anicteric sclerae.    NECK:  Supple.  No lymphadenopathy.  Trachea midline.  Full range of motion.  LUNGS:  Clear to auscultation bilaterally.  Normal respiratory effort.    HEART:  Regular rate, rhythm.  S1 and S2 auscultated.  No murmur.   ABDOMEN:  Soft, nondistended.  No tenderness.  Positive bowel sounds.    EXTREMITIES:  No edema, clubbing, or cyanosis.  Tenderness to anterior compartment of both thigh muscle groups.  NEUROLOGIC:  Motor and sensory intact.    ASSESSMENT AND PLAN:  Rhabdomyolysis, post strenuous exercise.  Patient will be admitted to general medical floor.  Aggressive IV fluid management.  Monitor CBC,  chemistry, electrolytes, kidney function, and CK level.  Nephrology consult.  Further recommendations to follow.    Dictated By Mirian Lawson MD  d: 03/18/2024 18:33:06  t: 03/18/2024 18:37:11  Job [id number]  FB/  Social Determinants of Health           Food Insecurity: No Food Insecurity (3/18/2024)     Food Insecurity      Food Insecurity: Never true   Transportation Needs: No Transportation Needs (3/18/2024)     Transportation Needs      Lack of Transportation: No   Housing Stability: Low Risk  (3/18/2024)     Housing Stability      Housing Instability: No         3/19/24 Nephrology     3-year-old female with no significant past medical history started working out. started with bike and later that night she started having bilateral lower extremity pain that progressively got worse and hence presented to the ER.  Also noted dark urine.  Her CK was 33,600 and her UA had 3+ blood.  She has started on IV fluids.  Pain persists but her urine has become less dark     Impression:  Rhabdomyolyosis, her CK is 33,600, fortunately no JOSH.  Aggressive IV fluids.  Trend BMP, avoid NSAIDs.  No Toradol.      Plan  BMP in a.m.  Continue normal saline      3/19/24 Hospitalist  Acute rhabdomyolyosis  - ck slightly improved   - will use norco for pain, avoid NSAIDs  - renal consulted  - cont aggressive IVFs  - trend bmp to make sure no renal failure develops     Morbid obesity  - BMI 40  - diet and exercise when stable     Prophylaxis:   DVT with SCDs     Dispo: pending clinical course     Global A/P  - reviewed labs and vitals from today  - reviewed notes of the day  - cbc, bmp, mag ordered for tomorrow  - discussed need to stay in the hospital today due to rhabdo  - cont IV fluids  - discussed with patient/RN and care team     MDM: high level      Yessica Garrett MD           03/19/24 1313 03/19/24 1413 03/19/24 1700   Pain Assessment: Ongoing & Relief (q4h & relief)   Pain Assessment Tool 0-10 0-10 No/denies  pain   0-10 Scale 8 (severe pain) 6 (moderate pain)  --    Pain Orientation  --   --   --    Pain Location  --   --   --    Pain Intervention(s) Medication  --   --    [place]ero Sedation Scale  --   --   --       03/19/24 1955 03/19/24 2055 03/20/24 0057   Pain Assessment: Ongoing & Relief (q4h & relief)   Pain Assessment Tool 0-10 0-10 0-10   0-10 Scale 6 (moderate pain) 3 (mild pain) 8 (severe pain)   Pain Orientation Right;Left  --  Right;Left   Pain Location Leg  --  Leg   Pain Intervention(s) Medication Declined Medication   Pasero Sedation Scale 1  --  1        03/18/24 2242 03/19/24 0906 03/19/24 1950   Musculoskeletal   RLE Limited movement  (r/t pain) Limited movement Limited movement  (d/t pain)   LLE Limited movement  (r/t pain) Limited movement Limited movement  (d/t pain)      03/20/24 0045   Musculoskeletal   RLE Limited movement  (d/t pain)   LLE Limited movement  (d/t pain)        Latest Reference Range & Units 03/18/24 16:28 03/19/24 06:11 03/20/24 06:57   Glucose 70 - 99 mg/dL 96 98 95   Sodium 136 - 145 mmol/L 140 140 141   Potassium 3.5 - 5.1 mmol/L 3.8 4.1 4.3   Chloride 98 - 112 mmol/L 108 111 111   Carbon Dioxide, Total 21.0 - 32.0 mmol/L 25.0 25.0 27.0   BUN 9 - 23 mg/dL 6 (L) 6 (L) <5 (L)   CREATININE 0.55 - 1.02 mg/dL 0.63 0.61 0.62   CALCIUM 8.7 - 10.4 mg/dL 9.7 8.9 9.1   BUN/CREATININE RATIO 10.0 - 20.0  9.5 (L) 9.8 (L)    EGFR >=60 mL/min/1.73m2 120 121 121   ANION GAP 0 - 18 mmol/L 7 4 3   CALCULATED OSMOLALITY 275 - 295 mOsm/kg 287 288    ALKALINE PHOSPHATASE 37 - 98 U/L 80     AST (SGOT) <=34 U/L 512 (H)     ALT (SGPT) 10 - 49 U/L 152 (H)     Total Bilirubin 0.3 - 1.2 mg/dL 0.4     Globulin 2.8 - 4.4 g/dL 3.4     Magnesium, Serum 1.6 - 2.6 mg/dL   1.9   CK 34 - 145 U/L 33,607 () 31,569 ()       [facility] Reference Range & Units 03/18/24 16:28 03/19/24 06:11 03/20/24 06:57   WBC 4.0 - 11.0 x10(3) uL 7.3 7.7 6.6   Hemoglobin 12.0 - 16.0 g/dL 12.7 11.1 (L) 10.5 (L)   Hematocrit 35.0 - 48.0  % 37.0 32.9 (L) 31.7 (L)   Platelet Count 150.0 - 450.0 10(3)uL 280.0 229.0 228.0   (L): Data is abnormally low    MEDICATIONS ADMINISTERED IN LAST 1 DAY:  heparin (Porcine) 5000 UNIT/ML injection 5,000 Units       Date Action Dose Route User    3/19/2024 2000 Given 5,000 Units Subcutaneous (Right Upper Abdomen) Lizeth Sawyer RN    3/19/2024 0906 Given 5,000 Units Subcutaneous (Left Lower Abdomen) Ligia Fox RN          HYDROcodone-acetaminophen (Norco) 5-325 MG per tab 1 tablet       Date Action Dose Route User    3/19/2024 1955 Given 1 tablet Oral Lizeth Sawyer RN    3/19/2024 1313 Given 1 tablet Oral Ligia Fox RN          HYDROcodone-acetaminophen (Norco) 5-325 MG per tab 2 tablet       Date Action Dose Route User    3/20/2024 0102 Given 2 tablet Oral Azucena Lucio RN          sodium chloride 0.9% infusion       Date Action Dose Route User    3/20/2024 0248 New Bag (none) Intravenous Azucena Lucio RN    3/19/2024 2001 New Bag (none) Intravenous Lizeth Sawyer RN            Vitals (last day)       Date/Time Temp Pulse Resp BP SpO2 Weight O2 Device O2 Flow Rate (L/min) [facility]    03/19/24 1949 98.8 °F (37.1 °C) 88 18 127/74 98 % -- None (Room air) -- SH    03/19/24 1555 97.8 °F (36.6 °C) 81 18 128/81 96 % -- None (Room air) -- MN    03/19/24 0903 98.4 °F (36.9 °C) 98 18 126/71 95 % -- None (Room air) -- MN    03/19/24 0614 98.8 °F (37.1 °C) 79 18 123/84 96 % -- None (Room air) -- BL

## 2024-03-20 NOTE — PLAN OF CARE
Problem: Patient Centered Care  Goal: Patient preferences are identified and integrated in the patient's plan of care  Description: Interventions:  - What would you like us to know as we care for you? Home with family   - Provide timely, complete, and accurate information to patient/family  - Incorporate patient and family knowledge, values, beliefs, and cultural backgrounds into the planning and delivery of care  - Encourage patient/family to participate in care and decision-making at the level they choose  - Honor patient and family perspectives and choices  Outcome: Progressing     Problem: Patient/Family Goals  Goal: Patient/Family Long Term Goal  Description: Patient's Long Term Goal: Discharge from the hospital     Interventions:  - Monitor vital signs  - Monitor appropriate labs  - Pain management  - Administer medications per order  - Follow MD orders  - Diagnostics per order  - Update / inform patient and family on plan of care  - Discharge planning  - See additional Care Plan goals for specific interventions  Outcome: Progressing  Goal: Patient/Family Short Term Goal  Description: Patient's Short Term Goal: Feel better    Interventions:   - Monitor vital signs  - Monitor appropriate labs  - Pain management  - Administer medications per order  - Follow MD orders  - Diagnostics per order  - Update / inform patient and family on plan of care  - See additional Care Plan goals for specific interventions  Outcome: Progressing     Problem: MUSCULOSKELETAL - ADULT  Goal: Return mobility to safest level of function  Description: INTERVENTIONS:  - Assess patient stability and activity tolerance for standing, transferring and ambulating w/ or w/o assistive devices  - Assist with transfers and ambulation using safe patient handling equipment as needed  - Ensure adequate protection for wounds/incisions during mobilization  - Obtain PT/OT consults as needed  - Advance activity as appropriate  - Communicate ordered  activity level and limitations with patient/family  Outcome: Progressing     Problem: Impaired Functional Mobility  Goal: Achieve highest/safest level of mobility/gait  Description: Interventions:  - Assess patient's functional ability and stability  - Promote increasing activity/tolerance for mobility and gait  - Educate and engage patient/family in tolerated activity level and precautions  Outcome: Progressing     Problem: PAIN - ADULT  Goal: Verbalizes/displays adequate comfort level or patient's stated pain goal  Description: INTERVENTIONS:  - Encourage pt to monitor pain and request assistance  - Assess pain using appropriate pain scale  - Administer analgesics based on type and severity of pain and evaluate response  - Implement non-pharmacological measures as appropriate and evaluate response  - Consider cultural and social influences on pain and pain management  - Manage/alleviate anxiety  - Utilize distraction and/or relaxation techniques  - Monitor for opioid side effects  - Notify MD/LIP if interventions unsuccessful or patient reports new pain  - Anticipate increased pain with activity and pre-medicate as appropriate  Outcome: Progressing     Problem: SAFETY ADULT - FALL  Goal: Free from fall injury  Description: INTERVENTIONS:  - Assess pt frequently for physical needs  - Identify cognitive and physical deficits and behaviors that affect risk of falls.  - [place] fall precautions as indicated by assessment.  - Educate pt/family on patient safety including physical limitations  - Instruct pt to call for assistance with activity based on assessment  - Modify environment to reduce risk of injury  - Provide assistive devices as appropriate  - Consider OT/PT consult to assist with strengthening/mobility  - Encourage toileting schedule  Outcome: Progressing     Problem: DISCHARGE PLANNING  Goal: Discharge to home or other facility with appropriate resources  Description: INTERVENTIONS:  - Identify barriers  to discharge w/pt and caregiver  - Include patient/family/discharge partner in discharge planning  - Arrange for needed discharge resources and transportation as appropriate  - Identify discharge learning needs (meds, wound care, etc)  - Arrange for interpreters to assist at discharge as needed  - Consider post-discharge preferences of patient/family/discharge partner  - Complete POLST form as appropriate  - Assess patient's ability to be responsible for managing their own health  - Refer to Case Management Department for coordinating discharge planning if the patient needs post-hospital services based on physician/LIP order or complex needs related to functional status, cognitive ability or social support system  Outcome: Progressing     Problem: METABOLIC/FLUID AND ELECTROLYTES - ADULT  Goal: Hemodynamic stability and optimal renal function maintained  Description: INTERVENTIONS:  - Monitor labs and assess for signs and symptoms of volume excess or deficit  - Monitor intake, output and patient weight  - Monitor urine specific gravity, serum osmolarity and serum sodium as indicated or ordered  - Monitor response to interventions for patient's volume status, including labs, urine output, blood pressure (other measures as available)  - Encourage oral intake as appropriate  - Instruct patient on fluid and nutrition restrictions as appropriate  Outcome: Progressing      Monitoring vital signs- stable at this time. Pain medication provided as needed. No acute changes noted at this moment. Safety and fall precautions maintained- I-bed awareness on, bed locked in lowest position, call light within reach. Frequent rounding by nursing staff.

## 2024-03-20 NOTE — PROGRESS NOTES
[facility]  part of [facility]    Progress Note    Jesse Ferrer Patient Status:  Inpatient    1990 MRN [id number]   Location [facility] 5SW/SE Attending Josh Saenz MD   Hosp Day # 2 PCP Jamie Díaz DO       Subjective:   Jesse Ferrer is a(n) 33 year old female who I am seeing for  rhabdo          Objective:   /71 (BP Location: Left arm)   Pulse 78   Temp 98.4 °F (36.9 °C) (Oral)   Resp 18   Ht 5' 6\" (1.676 m)   Wt 244 lb 12.8 oz (111 kg)   SpO2 90%   BMI 39.51 kg/m²      Intake/Output Summary (Last 24 hours) at 3/20/2024 1244  Last data filed at 3/20/2024 0600  Gross per 24 hour   Intake 2620 ml   Output --   Net 2620 ml     Wt Readings from Last 1 Encounters:   24 244 lb 12.8 oz (111 kg)       Exam  Vital signs: Blood pressure 119/71, pulse 78, temperature 98.4 °F (36.9 °C), temperature source Oral, resp. rate 18, height 5' 6\" (1.676 m), weight 244 lb 12.8 oz (111 kg), SpO2 90%, not currently breastfeeding.    General: No acute distress. Alert and oriented x 3.  HEENT: Moist mucous membranes. EOMI. PERRLA  Neck:  No JVD.   Respiratory: Clear to auscultation bilaterally.    Cardiovascular: S1, S2.  Regular rate and rhythm.   Abdomen: Soft, nontender, nondistended.    Neurologic: No focal neurological deficits.  Musculoskeletal: Full range of motion of all extremities.  No swelling noted.  Access:    Results:     Recent Labs   Lab 24  1628 24  0611 24  0657   RBC 4.78 4.20 4.07   HGB 12.7 11.1* 10.5*   HCT 37.0 32.9* 31.7*   MCV 77.4* 78.3* 77.9*   MCH 26.6 26.4 25.8*   MCHC 34.3 33.7 33.1   RDW 13.9 13.8 13.6   NEPRELIM 4.60 3.93 3.10   WBC 7.3 7.7 6.6   .0 229.0 228.0         Recent Labs   Lab 24  1628 24  0611 24  0657   GLU 96 98 95   BUN 6* 6* <5*   CREATSERUM 0.63 0.61 0.62   CA 9.7 8.9 9.1    140 141   K 3.8 4.1 4.3    111 111   CO2 25.0 25.0 27.0          No results  found.    Assessment and Plan:     33-year-old female with no significant past medical history started working out. started with bike and later that night she started having bilateral lower extremity pain that progressively got worse and hence presented to the ER.  Also noted dark urine.  Her CK was 33,600 and her UA had 3+ blood.  She has started on IV fluids.  Pain persists but her urine has become less dark     Impression:    Rhabdomyolyosis, her CK is 33,600, --> 47 K  worsened today however  fortunately no JOSH.  Aggressive IV fluids--> will inc rate .  Trend BMP, avoid NSAIDs.  No Toradol.         Plan:    Inc NS to 150 ml/hr  BMP and CK  in am      Thank you very much for allowing me to participate in the care of your patient.  If you have any questions, please do not hesitate to contact me.     Dina Pollard MD  3/20/2024

## 2024-03-21 VITALS
DIASTOLIC BLOOD PRESSURE: 74 MMHG | BODY MASS INDEX: 39.34 KG/M2 | SYSTOLIC BLOOD PRESSURE: 121 MMHG | HEIGHT: 66 IN | WEIGHT: 244.81 LBS | OXYGEN SATURATION: 100 % | HEART RATE: 67 BPM | TEMPERATURE: 99 F | RESPIRATION RATE: 18 BRPM

## 2024-03-21 LAB
ALBUMIN SERPL-MCNC: 3.4 G/DL (ref 3.2–4.8)
ALBUMIN/GLOB SERPL: 1.2 {RATIO} (ref 1–2)
ALP LIVER SERPL-CCNC: 67 U/L
ALT SERPL-CCNC: 245 U/L
ANION GAP SERPL CALC-SCNC: 7 MMOL/L (ref 0–18)
AST SERPL-CCNC: 729 U/L (ref ?–34)
BASOPHILS # BLD AUTO: 0.02 X10(3) UL (ref 0–0.2)
BASOPHILS NFR BLD AUTO: 0.3 %
BILIRUB SERPL-MCNC: 0.2 MG/DL (ref 0.3–1.2)
BUN BLD-MCNC: 6 MG/DL (ref 9–23)
BUN/CREAT SERPL: 10.3 (ref 10–20)
CALCIUM BLD-MCNC: 8.8 MG/DL (ref 8.7–10.4)
CHLORIDE SERPL-SCNC: 108 MMOL/L (ref 98–112)
CK SERPL-CCNC: ABNORMAL U/L
CO2 SERPL-SCNC: 25 MMOL/L (ref 21–32)
CREAT BLD-MCNC: 0.58 MG/DL
DEPRECATED RDW RBC AUTO: 38 FL (ref 35.1–46.3)
EGFRCR SERPLBLD CKD-EPI 2021: 122 ML/MIN/1.73M2 (ref 60–?)
EOSINOPHIL # BLD AUTO: 0.35 X10(3) UL (ref 0–0.7)
EOSINOPHIL NFR BLD AUTO: 5 %
ERYTHROCYTE [DISTWIDTH] IN BLOOD BY AUTOMATED COUNT: 13.8 % (ref 11–15)
GLOBULIN PLAS-MCNC: 2.9 G/DL (ref 2.8–4.4)
GLUCOSE BLD-MCNC: 102 MG/DL (ref 70–99)
HCT VFR BLD AUTO: 31.8 %
HGB BLD-MCNC: 10.6 G/DL
IMM GRANULOCYTES # BLD AUTO: 0.02 X10(3) UL (ref 0–1)
IMM GRANULOCYTES NFR BLD: 0.3 %
LYMPHOCYTES # BLD AUTO: 2.86 X10(3) UL (ref 1–4)
LYMPHOCYTES NFR BLD AUTO: 40.7 %
MAGNESIUM SERPL-MCNC: 1.8 MG/DL (ref 1.6–2.6)
MCH RBC QN AUTO: 25.4 PG (ref 26–34)
MCHC RBC AUTO-ENTMCNC: 33.3 G/DL (ref 31–37)
MCV RBC AUTO: 76.3 FL
MONOCYTES # BLD AUTO: 0.57 X10(3) UL (ref 0.1–1)
MONOCYTES NFR BLD AUTO: 8.1 %
NEUTROPHILS # BLD AUTO: 3.21 X10 (3) UL (ref 1.5–7.7)
NEUTROPHILS # BLD AUTO: 3.21 X10(3) UL (ref 1.5–7.7)
NEUTROPHILS NFR BLD AUTO: 45.6 %
OSMOLALITY SERPL CALC.SUM OF ELEC: 288 MOSM/KG (ref 275–295)
PHOSPHATE SERPL-MCNC: 4.3 MG/DL (ref 2.4–5.1)
PLATELET # BLD AUTO: 245 10(3)UL (ref 150–450)
POTASSIUM SERPL-SCNC: 3.9 MMOL/L (ref 3.5–5.1)
PROT SERPL-MCNC: 6.3 G/DL (ref 5.7–8.2)
RBC # BLD AUTO: 4.17 X10(6)UL
SODIUM SERPL-SCNC: 140 MMOL/L (ref 136–145)
WBC # BLD AUTO: 7 X10(3) UL (ref 4–11)

## 2024-03-21 PROCEDURE — 99239 HOSP IP/OBS DSCHRG MGMT >30: CPT | Performed by: HOSPITALIST

## 2024-03-21 PROCEDURE — 99232 SBSQ HOSP IP/OBS MODERATE 35: CPT | Performed by: INTERNAL MEDICINE

## 2024-03-21 RX ORDER — HEPARIN SODIUM 5000 [USP'U]/ML
7500 INJECTION, SOLUTION INTRAVENOUS; SUBCUTANEOUS EVERY 12 HOURS SCHEDULED
Status: DISCONTINUED | OUTPATIENT
Start: 2024-03-21 | End: 2024-03-21

## 2024-03-21 RX ORDER — MAGNESIUM OXIDE 400 MG/1
400 TABLET ORAL ONCE
Status: COMPLETED | OUTPATIENT
Start: 2024-03-21 | End: 2024-03-21

## 2024-03-21 NOTE — PROGRESS NOTES
[facility]  part of [facility]    Progress Note    Jesse Ferrer Patient Status:  Inpatient    1990 MRN [id number]   Location [facility] 5SW/SE Attending Josh Saenz MD   Hosp Day # 3 PCP Jamie Díaz DO       Subjective:   Jesse Ferrer is a(n) 33 year old female who I am seeing for  rhabdo    No new issues  Feels better  Urinating a lot  Colour is clearing up      Objective:   /74 (BP Location: Left arm)   Pulse 67   Temp 98.6 °F (37 °C) (Oral)   Resp 18   Ht 5' 6\" (1.676 m)   Wt 244 lb 12.8 oz (111 kg)   SpO2 100%   BMI 39.51 kg/m²      Intake/Output Summary (Last 24 hours) at 3/21/2024 1315  Last data filed at 3/21/2024 0400  Gross per 24 hour   Intake 1200 ml   Output --   Net 1200 ml     Wt Readings from Last 1 Encounters:   24 244 lb 12.8 oz (111 kg)       Exam  Vital signs: Blood pressure 121/74, pulse 67, temperature 98.6 °F (37 °C), temperature source Oral, resp. rate 18, height 5' 6\" (1.676 m), weight 244 lb 12.8 oz (111 kg), SpO2 100%, not currently breastfeeding.    General: No acute distress. Alert and oriented x 3.  HEENT: Moist mucous membranes. EOMI. PERRLA  Neck:  No JVD.   Respiratory: Clear to auscultation bilaterally.    Cardiovascular: S1, S2.  Regular rate and rhythm.   Abdomen: Soft, nontender, nondistended.    Neurologic: No focal neurological deficits.  Musculoskeletal: Full range of motion of all extremities.  No swelling noted.  Access:    Results:     Recent Labs   Lab 24  0611 24  0657 24  0556   RBC 4.20 4.07 4.17   HGB 11.1* 10.5* 10.6*   HCT 32.9* 31.7* 31.8*   MCV 78.3* 77.9* 76.3*   MCH 26.4 25.8* 25.4*   MCHC 33.7 33.1 33.3   RDW 13.8 13.6 13.8   NEPRELIM 3.93 3.10 3.21   WBC 7.7 6.6 7.0   .0 228.0 245.0         Recent Labs   Lab 24  0611 24  0657 24  0556   GLU 98 95 102*   BUN 6* <5* 6*   CREATSERUM 0.61 0.62 0.58   CA 8.9 9.1 8.8    141 140   K 4.1 4.3 3.9     111 108   CO2 25.0 27.0 25.0          No results found.    Assessment and Plan:     33-year-old female with no significant past medical history started working out. started with bike and later that night she started having bilateral lower extremity pain that progressively got worse and hence presented to the ER.  Also noted dark urine.  Her CK was 33,600 and her UA had 3+ blood.  She has started on IV fluids.  Pain persists but her urine has become less dark     Impression:    Rhabdomyolyosis, her CK is 33,600, --> 47 K  worsened today however  fortunately no JOSH.  Aggressive IV fluids--> will inc rate .  Trend BMP, avoid NSAIDs.  No Toradol.  Today CK levels are better. Cr stable        Plan:    Ok to dc from renal perspective, continue hydration at home.    Thank you very much for allowing me to participate in the care of your patient.  If you have any questions, please do not hesitate to contact me.     Dina Pollard MD

## 2024-03-21 NOTE — PAYOR COMM NOTE
--------------  CONTINUED STAY REVIEW    Payor: [facility]  Subscriber #:  [id number]  Authorization Number: [id number]    Admit date: 3/18/24  Admit time:  8:28 PM    Admitting Physician: Mirian Lawson MD  Attending Physician:  Yessica Garrett MD  Primary Care Physician: Jamie Díaz,     REVIEW DOCUMENTATION:    3/20/24 Nephrology      Assessment and Plan:      33-year-old female with no significant past medical history started working out. started with bike and later that night she started having bilateral lower extremity pain that progressively got worse and hence presented to the ER.  Also noted dark urine.  Her CK was 33,600 and her UA had 3+ blood.  She has started on IV fluids.  Pain persists but her urine has become less dark      Impression:     Rhabdomyolyosis, her CK is 33,600, --> 47 K  worsened today however  fortunately no JOSH.  Aggressive IV fluids--> will inc rate .  Trend BMP, avoid NSAIDs.  No Toradol.        Plan:   Inc NS to 150 ml/hr  BMP and CK  in am      Hospitalist    Plan:      Acute rhabdomyolyosis  - ck slightly trended up  - will use norco for pain, avoid NSAIDs  -CK was 33,600 on initial evaluation initially improved but now over 47K   - renal consulted  - cont aggressive IVFs, increase IV fluid to 150 an hour  - trend bmp to make sure no renal failure develops  -Repeat labs in the morning     Elevated transaminases  -Likely related to acute rhabdo  -Outpatient follow-up to document resolution or further outpatient workup     Morbid obesity  - BMI 40  - diet and exercise when stable     Prophylaxis:   DVT with SCDs     Dispo: pending clinical course     Global A/P  - reviewed labs and vitals from today  - reviewed notes of the day  - cbc, bmp, mag ordered for tomorrow  - discussed need to stay in the hospital today due to rhabdo  - cont IV fluids  - discussed with patient/RN and care team     MDM: high andrey JUAREZ,  MD      3/21/24    Nursing       Pt alert and oriented x4. Reports pain to BLE. [place] administered for pain management. On IVF 0.9 NS @ 150ml/hr.            Latest Reference Range & Units 03/18/24 16:28 03/19/24 06:11 03/20/24 10:03 03/21/24 05:56   CK 34 - 145 U/L 33,607 (HH) 31,569 (HH) 47,650 (HH) 36,901 (HH) (C)        Latest Reference Range & Units 03/19/24 06:11 03/20/24 06:57 03/21/24 05:56   Glucose 70 - 99 mg/dL 98 95 102 (H)   Sodium 136 - 145 mmol/L 140 141 140   Potassium 3.5 - 5.1 mmol/L 4.1 4.3 3.9   Chloride 98 - 112 mmol/L 111 111 108   Carbon Dioxide, Total 21.0 - 32.0 mmol/L 25.0 27.0 25.0   BUN 9 - 23 mg/dL 6 (L) <5 (L) 6 (L)   CREATININE 0.55 - 1.02 mg/dL 0.61 0.62 0.58   CALCIUM 8.7 - 10.4 mg/dL 8.9 9.1 8.8   BUN/CREATININE RATIO 10.0 - 20.0  9.8 (L)  10.3   (   Latest Reference Range & Units 03/21/24 05:56   AST (SGOT) <=34 U/L 729 (H)   ALT (SGPT) 10 - 49 U/L 245 (H)       MEDICATIONS ADMINISTERED IN LAST 1 DAY:  heparin (Porcine) 5000 UNIT/ML injection 5,000 Units       Date Action Dose Route User    3/20/2024 2112 Given 5,000 Units Subcutaneous (Right Lower Abdomen) Nikia Thurston RN    3/20/2024 0931 Given 5,000 Units Subcutaneous (Left Lower Abdomen) Tash Griffin RN          HYDROcodone-acetaminophen (Norco) 5-325 MG per tab 1 tablet       Date Action Dose Route User    3/21/2024 0435 Given 1 tablet Oral Nikia Thurston RN    3/20/2024 0931 Given 1 tablet Oral Tash Griffin RN          HYDROcodone-acetaminophen (Norco) 5-325 MG per tab 2 tablet       Date Action Dose Route User    3/20/2024 2112 Given 2 tablet Oral [place], Nikai Cabrera RN    3/20/2024 1555 Given 2 tablet Oral Yu Joseph RN          sodium chloride 0.9% infusion       Date Action Dose Route User    3/20/2024 1627 New Bag (none) Intravenous Yu Joseph RN    3/20/2024 1247 Rate/Dose Change (none) Intravenous Yu Joseph RN    3/20/2024 0932 New Bag (none) Intravenous Tash Griffin RN             Vitals (last day)       Date/Time Temp Pulse Resp BP SpO2 Weight O2 Device O2 Flow Rate (L/min) [facility]    03/21/24 0434 98.7 °F (37.1 °C) 83 16 112/57 100 % -- None (Room air) --     03/20/24 2006 98.5 °F (36.9 °C) 79 16 115/66 100 % -- None (Room air) --     03/20/24 1557 98.8 °F (37.1 °C) 82 18 122/69 97 % -- None (Room air) -- MS    03/20/24 0924 98.4 °F (36.9 °C) 78 18 119/71 90 % -- None (Room air) -- EMMANUEL PARIKH RN

## 2024-03-21 NOTE — PLAN OF CARE
Patient appropriate for discharge per MD. PIV removed. Education reviewed with patient and spouse at bedside. Belongings and paperwork sent with patient.

## 2024-03-21 NOTE — PLAN OF CARE
Pt alert and oriented x4. Reports pain to BLE. [place] administered for pain management. On IVF 0.9 NS @ 150ml/hr.     Problem: Patient Centered Care  Goal: Patient preferences are identified and integrated in the patient's plan of care  Description: Interventions:  - What would you like us to know as we care for you? Home with family   - Provide timely, complete, and accurate information to patient/family  - Incorporate patient and family knowledge, values, beliefs, and cultural backgrounds into the planning and delivery of care  - Encourage patient/family to participate in care and decision-making at the level they choose  - Honor patient and family perspectives and choices  Outcome: Progressing     Problem: MUSCULOSKELETAL - ADULT  Goal: Return mobility to safest level of function  Description: INTERVENTIONS:  - Assess patient stability and activity tolerance for standing, transferring and ambulating w/ or w/o assistive devices  - Assist with transfers and ambulation using safe patient handling equipment as needed  - Ensure adequate protection for wounds/incisions during mobilization  - Obtain PT/OT consults as needed  - Advance activity as appropriate  - Communicate ordered activity level and limitations with patient/family  Outcome: Progressing     Problem: METABOLIC/FLUID AND ELECTROLYTES - ADULT  Goal: Hemodynamic stability and optimal renal function maintained  Description: INTERVENTIONS:  - Monitor labs and assess for signs and symptoms of volume excess or deficit  - Monitor intake, output and patient weight  - Monitor urine specific gravity, serum osmolarity and serum sodium as indicated or ordered  - Monitor response to interventions for patient's volume status, including labs, urine output, blood pressure (other measures as available)  - Encourage oral intake as appropriate  - Instruct patient on fluid and nutrition restrictions as appropriate  Outcome: Progressing     Problem: PAIN - ADULT  Goal:  Verbalizes/displays adequate comfort level or patient's stated pain goal  Description: INTERVENTIONS:  - Encourage pt to monitor pain and request assistance  - Assess pain using appropriate pain scale  - Administer analgesics based on type and severity of pain and evaluate response  - Implement non-pharmacological measures as appropriate and evaluate response  - Consider cultural and social influences on pain and pain management  - Manage/alleviate anxiety  - Utilize distraction and/or relaxation techniques  - Monitor for opioid side effects  - Notify MD/LIP if interventions unsuccessful or patient reports new pain  - Anticipate increased pain with activity and pre-medicate as appropriate  Outcome: Progressing

## 2024-03-21 NOTE — PLAN OF CARE
Problem: Patient Centered Care  Goal: Patient preferences are identified and integrated in the patient's plan of care  Description: Interventions:  - What would you like us to know as we care for you? Home with family   - Provide timely, complete, and accurate information to patient/family  - Incorporate patient and family knowledge, values, beliefs, and cultural backgrounds into the planning and delivery of care  - Encourage patient/family to participate in care and decision-making at the level they choose  - Honor patient and family perspectives and choices  Outcome: Adequate for Discharge     Problem: Patient/Family Goals  Goal: Patient/Family Long Term Goal  Description: Patient's Long Term Goal: Discharge from the hospital     Interventions:  - Monitor vital signs  - Monitor appropriate labs  - Pain management  - Administer medications per order  - Follow MD orders  - Diagnostics per order  - Update / inform patient and family on plan of care  - Discharge planning  - See additional Care Plan goals for specific interventions  Outcome: Adequate for Discharge  Goal: Patient/Family Short Term Goal  Description: Patient's Short Term Goal: Feel better    Interventions:   - Monitor vital signs  - Monitor appropriate labs  - Pain management  - Administer medications per order  - Follow MD orders  - Diagnostics per order  - Update / inform patient and family on plan of care  - See additional Care Plan goals for specific interventions  Outcome: Adequate for Discharge     Problem: MUSCULOSKELETAL - ADULT  Goal: Return mobility to safest level of function  Description: INTERVENTIONS:  - Assess patient stability and activity tolerance for standing, transferring and ambulating w/ or w/o assistive devices  - Assist with transfers and ambulation using safe patient handling equipment as needed  - Ensure adequate protection for wounds/incisions during mobilization  - Obtain PT/OT consults as needed  - Advance activity as  appropriate  - Communicate ordered activity level and limitations with patient/family  Outcome: Adequate for Discharge     Problem: Impaired Functional Mobility  Goal: Achieve highest/safest level of mobility/gait  Description: Interventions:  - Assess patient's functional ability and stability  - Promote increasing activity/tolerance for mobility and gait  - Educate and engage patient/family in tolerated activity level and precautions  - Recommend use of  RW  Problem: PAIN - ADULT  Goal: Verbalizes/displays adequate comfort level or patient's stated pain goal  Description: INTERVENTIONS:  - Encourage pt to monitor pain and request assistance  - Assess pain using appropriate pain scale  - Administer analgesics based on type and severity of pain and evaluate response  - Implement non-pharmacological measures as appropriate and evaluate response  - Consider cultural and social influences on pain and pain management  - Manage/alleviate anxiety  - Utilize distraction and/or relaxation techniques  - Monitor for opioid side effects  - Notify MD/LIP if interventions unsuccessful or patient reports new pain  - Anticipate increased pain with activity and pre-medicate as appropriate  Outcome: Adequate for Discharge     Problem: SAFETY ADULT - FALL  Goal: Free from fall injury  Description: INTERVENTIONS:  - Assess pt frequently for physical needs  - Identify cognitive and physical deficits and behaviors that affect risk of falls.  - [place] fall precautions as indicated by assessment.  - Educate pt/family on patient safety including physical limitations  - Instruct pt to call for assistance with activity based on assessment  - Modify environment to reduce risk of injury  - Provide assistive devices as appropriate  - Consider OT/PT consult to assist with strengthening/mobility  - Encourage toileting schedule  Outcome: Adequate for Discharge     Problem: DISCHARGE PLANNING  Goal: Discharge to home or other facility with  appropriate resources  Description: INTERVENTIONS:  - Identify barriers to discharge w/pt and caregiver  - Include patient/family/discharge partner in discharge planning  - Arrange for needed discharge resources and transportation as appropriate  - Identify discharge learning needs (meds, wound care, etc)  - Arrange for interpreters to assist at discharge as needed  - Consider post-discharge preferences of patient/family/discharge partner  - Complete POLST form as appropriate  - Assess patient's ability to be responsible for managing their own health  - Refer to Case Management Department for coordinating discharge planning if the patient needs post-hospital services based on physician/LIP order or complex needs related to functional status, cognitive ability or social support system  Outcome: Adequate for Discharge    for transfers and ambulation  Patient is medically stable and is being discharge home today.  Outcome: Adequate for Discharge     Problem: METABOLIC/FLUID AND ELECTROLYTES - ADULT  Goal: Hemodynamic stability and optimal renal function maintained  Description: INTERVENTIONS:  - Monitor labs and assess for signs and symptoms of volume excess or deficit  - Monitor intake, output and patient weight  - Monitor urine specific gravity, serum osmolarity and serum sodium as indicated or ordered  - Monitor response to interventions for patient's volume status, including labs, urine output, blood pressure (other measures as available)  - Encourage oral intake as appropriate  - Instruct patient on fluid and nutrition restrictions as appropriate  Outcome: Adequate for Discharge     Problem: SKIN/TISSUE INTEGRITY - ADULT  Goal: Skin integrity remains intact  Description: INTERVENTIONS  - Assess and document risk factors for pressure ulcer development  - Assess and document skin integrity  - Monitor for areas of redness and/or skin breakdown  - Initiate interventions, skin care algorithm/standards of care as  needed  Outcome: Adequate for Discharge

## 2024-03-21 NOTE — DISCHARGE SUMMARY
[facility]  part of Located within [facility]    Discharge Summary    Jesse Ferrer Patient Status:  Inpatient    1990 MRN [id number]   Location [facility] 5SW/SE Attending Yessica Garrett MD   Hosp Day # 3 PCP Jamie Díaz DO     Date of Admission: 3/18/2024      Date of Discharge: 24      Admitting Diagnosis: Non-traumatic rhabdomyolysis [M62.82]    Hospital Discharge Diagnoses:  Rhabdo    Lace+ Score: 19  59-90 High Risk  29-58 Medium Risk  0-28   Low Risk.    TCM Follow-Up Recommendation:  LACE < 29: Low Risk of readmission after discharge from the hospital; Still recommend for TCM follow-up.          Problem List:   Patient Active Problem List   Diagnosis    Radiation exposure affecting pregnancy in first trimester ([facility])    Obesity affecting pregnancy, antepartum ([facility])    Dichorionic diamniotic twin pregnancy, antepartum ([facility])    Migraine without aura and without status migrainosus, not intractable    Supervision of normal pregnancy ([facility])    Antepartum anemia ([facility])    False labor before 37 completed weeks of gestation in third trimester ([facility])    Dichorionic diamniotic twin pregnancy in third trimester ([facility])    Non-traumatic rhabdomyolysis         Physical Exam:     Gen: No acute distress  Pulm: Lungs clear, normal respiratory effort  CV: Heart with regular rate and rhythm  Abd: Abdomen soft, nontender, nondistended, bowel sounds present  Neuro: No acute focal deficits  MSK: Full range of motion in extremities  Skin: Warm and dry  Psych: Normal affect  Ext: no c/c/e      History of Present Illness: Per Admit MD    The patient is a 33-year-old  female who started bike exercise regimen 2 days ago and yesterday started developing extreme pain in her both thigh muscle areas and today pain got worse, and she decided to come into the emergency department for evaluation. CBC and chemistry were unremarkable. Urinalysis showed mild leukocyte esterase 3+ blood and  CK was 33,600. Started on aggressive IV fluids, and she will be admitted to the hospital for further management.     Hospital Course:     Acute rhabdomyolyosis  - avoid NSAIDs  - CK improved  - renal consulted  - given aggressive IVFs, stable for dc     Elevated transaminases  -Likely related to acute rhabdo  -Outpatient follow-up to document resolution or further outpatient workup     Morbid obesity  - BMI 40  - diet and exercise when stable       Discharge Condition: Stable    Discharge Medications:      Discharge Medications        CONTINUE taking these medications        Instructions Prescription details   Breast Pump Misc      DOUBLE ELECTRIC BREAST PUMP EQUIVALENT TO MEDELA PUMP IN STYLE   Quantity: 1 each  Refills: 0            STOP taking these medications      SM Prenatal Vitamins 28-0.8 MG Tabs                     Yessica Garrett MD  3/21/2024  12:29 PM    Greater than 30 minutes spent on preparation and coordination of this discharge

## 2024-11-18 ENCOUNTER — HOSPITAL ENCOUNTER (EMERGENCY)
Facility: HOSPITAL | Age: 34
Discharge: HOME | End: 2024-11-18
Attending: EMERGENCY MEDICINE
Payer: MEDICAID

## 2024-11-18 ENCOUNTER — APPOINTMENT (OUTPATIENT)
Dept: GENERAL RADIOLOGY | Facility: HOSPITAL | Age: 34
End: 2024-11-18
Attending: EMERGENCY MEDICINE
Payer: MEDICAID

## 2024-11-18 VITALS
BODY MASS INDEX: 36.32 KG/M2 | HEIGHT: 66 IN | OXYGEN SATURATION: 100 % | DIASTOLIC BLOOD PRESSURE: 78 MMHG | HEART RATE: 101 BPM | WEIGHT: 226 LBS | SYSTOLIC BLOOD PRESSURE: 122 MMHG | RESPIRATION RATE: 20 BRPM | TEMPERATURE: 99 F

## 2024-11-18 DIAGNOSIS — J18.9 PNEUMONIA DUE TO INFECTIOUS ORGANISM, UNSPECIFIED LATERALITY, UNSPECIFIED PART OF LUNG: Primary | ICD-10-CM

## 2024-11-18 LAB
FLUAV + FLUBV RNA SPEC NAA+PROBE: NEGATIVE
FLUAV + FLUBV RNA SPEC NAA+PROBE: NEGATIVE
RSV RNA SPEC NAA+PROBE: NEGATIVE
SARS-COV-2 RNA RESP QL NAA+PROBE: NOT DETECTED

## 2024-11-18 PROCEDURE — 71046 X-RAY EXAM CHEST 2 VIEWS: CPT | Performed by: EMERGENCY MEDICINE

## 2024-11-18 PROCEDURE — 99284 EMERGENCY DEPT VISIT MOD MDM: CPT

## 2024-11-18 PROCEDURE — 0241U SARS-COV-2/FLU A AND B/RSV BY PCR (GENEXPERT): CPT | Performed by: EMERGENCY MEDICINE

## 2024-11-18 PROCEDURE — 0241U SARS-COV-2/FLU A AND B/RSV BY PCR (GENEXPERT): CPT

## 2024-11-18 RX ORDER — BENZONATATE 100 MG/1
100 CAPSULE ORAL 3 TIMES DAILY PRN
Qty: 30 CAPSULE | Refills: 0 | Status: SHIPPED | OUTPATIENT
Start: 2024-11-18 | End: 2024-12-18

## 2024-11-18 RX ORDER — AZITHROMYCIN 250 MG/1
TABLET, FILM COATED ORAL
Qty: 6 TABLET | Refills: 0 | Status: SHIPPED | OUTPATIENT
Start: 2024-11-18 | End: 2024-11-23

## 2024-11-18 RX ORDER — ALBUTEROL SULFATE 90 UG/1
2 INHALANT RESPIRATORY (INHALATION) 4 TIMES DAILY
Status: DISCONTINUED | OUTPATIENT
Start: 2024-11-18 | End: 2024-11-18

## 2024-11-18 NOTE — ED PROVIDER NOTES
Patient Seen in: [facility] Emergency Department      History     Chief Complaint   Patient presents with    Viral Syndrome     Stated Complaint: Fever    Subjective:   33-year-old female who states he does not have significant medical history and does not smoke here with 3 days of cough, congestion feeling hot and cold/ fever and a little diarrhea.  .   She has no chest pain but feels a slight tightness in her lungs.  No dyspnea.  No vomiting.  She is eating and drinking fine.  No swelling or rash.  Menstrual cycle normal does not believe she is pregnant.  No focal weakness or numbness.  Patient has 3 other family members being evaluated for similar complaints including 2 kids.              Objective:     Past Medical History:    Human papilloma virus infection    high risk/coploscopy     Obesity    Ovarian cyst              History reviewed. No pertinent surgical history.             Social History     Socioeconomic History    Marital status: Life Partner   Tobacco Use    Smoking status: Never    Smokeless tobacco: Never   Vaping Use    Vaping status: Never Used   Substance and Sexual Activity    Alcohol use: Never    Drug use: Never     Social Drivers of Health     Food Insecurity: No Food Insecurity (3/18/2024)    Food Insecurity     Food Insecurity: Never true   Transportation Needs: No Transportation Needs (3/18/2024)    Transportation Needs     Lack of Transportation: No   Housing Stability: Low Risk  (3/18/2024)    Housing Stability     Housing Instability: No                  Physical Exam     ED Triage Vitals [11/18/24 1401]   /64   Pulse 118   Resp 18   Temp 99.2 °F (37.3 °C)   Temp src Oral   SpO2 95 %   O2 Device None (Room air)       Current Vitals:   Vital Signs  BP: 122/78  Pulse: 101  Resp: 20  Temp: 99.2 °F (37.3 °C)  Temp src: Oral    Oxygen Therapy  SpO2: 100 %  O2 Device: None (Room air)        Physical Exam  HENT:      Right Ear: External ear normal.      Nose: Congestion  present.      Mouth/Throat:      Mouth: Mucous membranes are dry.   Eyes:      Extraocular Movements: Extraocular movements intact.   Cardiovascular:      Rate and Rhythm: Normal rate and regular rhythm.      Pulses: Normal pulses.      Heart sounds: Normal heart sounds.      Comments: Heart rate about 100 on my exam  Pulmonary:      Effort: Pulmonary effort is normal.      Breath sounds: Normal breath sounds.   Abdominal:      Palpations: Abdomen is soft.      Tenderness: There is no abdominal tenderness.   Musculoskeletal:         General: Normal range of motion.      Cervical back: Normal range of motion.   Skin:     General: Skin is warm and dry.   Neurological:      General: No focal deficit present.      Mental Status: She is alert.      Sensory: No sensory deficit.      Motor: No weakness.             ED Course     Labs Reviewed   SARS-COV-2/FLU A AND B/RSV BY PCR (GENEXPERT) - Normal    Narrative:     This test is intended for the qualitative detection and differentiation of SARS-CoV-2, influenza A, influenza B, and respiratory syncytial virus (RSV) viral RNA in nasopharyngeal or nares swabs from individuals suspected of respiratory viral infection consistent with COVID-19 by their healthcare provider. Signs and symptoms of respiratory viral infection due to SARS-CoV-2, influenza, and RSV can be similar.    Test performed using the Xpert Xpress SARS-CoV-2/FLU/RSV (real time RT-PCR)  assay on the GeneXpert instrument, [place], [place] [address].   This test is being used under the Food and Drug Administration's Emergency Use Authorization.    The authorized Fact Sheet for Healthcare Providers for this assay is available upon request from the laboratory.       ED Course as of 11/18/24 1740  ------------------------------------------------------------  Time: 11/18 0834  Comment: Details      Reading Physician Reading Date Result Priority  Rashawn Daley,  MD  [phone] 11/18/2024     Narrative  PROCEDURE: XR CHEST PA + LAT CHEST (CPT=71046)     COMPARISON: None.     INDICATIONS: Productive cough, shortness of breath and fever since Friday, worse today.     TECHNIQUE:   Two views.       Impression:     Small pulmonary consolidation posterior left lower lobe, most likely pulmonary infection     No effusion new     Normal heart size  Finalized by (CST): Yoshi Daley MD on 11/18/2024 at 3:48 PM                    MDM      No results found.          Medical Decision Making  Patient with 3 days of cough and diarrhea and lung tightness and some congestion and cold and possibly having a fever here with other sick family members.  Differential could include but is not limited to pneumonia, virus, bacterial infection, bronchospasm and many other possibilities.  Waiting on GEN expert swab.  Will perform chest x-ray give albuterol.  Pulse ox is normal in triage 95% room air.  She was afebrile.  Heart rate was slightly tachycardic and I will check this because she was 100 on my exam.    Amount and/or Complexity of Data Reviewed  Labs: ordered. Decision-making details documented in ED Course.  Radiology: ordered and independent interpretation performed.     Details: Small infiltrate  Discussion of management or test interpretation with external provider(s): Patient given antibiotics for small infiltrate.  Albuterol on the day I given.  Prescriptions written.  Patient will follow-up with primary or return here for changes worsening.  Heart rate did improve    Risk  Prescription drug management.        Disposition and Plan     Clinical Impression:  1. Pneumonia due to infectious organism, unspecified laterality, unspecified part of lung         Disposition:  Discharge  11/18/2024  4:10 pm    Follow-up:  [facility]  [facility] [address] [address]  [phone]    Follow up            Medications Prescribed:  Discharge Medication List as  of 11/18/2024  4:37 PM        START taking these medications    Details   azithromycin (ZITHROMAX Z-NIGHAT) 250 MG Oral Tab 500 mg once followed by 250 mg daily x 4 days, Normal, Disp-6 tablet, R-0      benzonatate 100 MG Oral Cap Take 1 capsule (100 mg total) by mouth 3 (three) times daily as needed for cough., Normal, Disp-30 capsule, R-0                 Supplementary Documentation:

## 2024-11-18 NOTE — DISCHARGE INSTRUCTIONS
Take 2 puffs albuterol every 4 hours for the next 2 days as needed for cough.    Tessalon Perles for cough as needed. Take the antibiotic as directed.     Follow-up with your doctor in 2 days for reevaluation.Return to the ER for changes or worsening and read all instructions.

## 2024-11-18 NOTE — ED QUICK NOTES
Pt states having cough/congestion, diarrhea since Friday.  States chills and fevers as well. States tylenol and theraflu have not helped.

## 2025-03-24 ENCOUNTER — APPOINTMENT (OUTPATIENT)
Dept: GENERAL RADIOLOGY | Age: 35
End: 2025-03-24
Attending: STUDENT IN AN ORGANIZED HEALTH CARE EDUCATION/TRAINING PROGRAM

## 2025-03-24 ENCOUNTER — HOSPITAL ENCOUNTER (EMERGENCY)
Age: 35
Discharge: HOME | End: 2025-03-24
Attending: STUDENT IN AN ORGANIZED HEALTH CARE EDUCATION/TRAINING PROGRAM

## 2025-03-24 ENCOUNTER — APPOINTMENT (OUTPATIENT)
Dept: CT IMAGING | Age: 35
End: 2025-03-24
Attending: STUDENT IN AN ORGANIZED HEALTH CARE EDUCATION/TRAINING PROGRAM

## 2025-03-24 VITALS
OXYGEN SATURATION: 94 % | RESPIRATION RATE: 18 BRPM | TEMPERATURE: 98.5 F | DIASTOLIC BLOOD PRESSURE: 84 MMHG | HEART RATE: 75 BPM | SYSTOLIC BLOOD PRESSURE: 121 MMHG

## 2025-03-24 DIAGNOSIS — M25.551 RIGHT HIP PAIN: ICD-10-CM

## 2025-03-24 DIAGNOSIS — Z04.1 ENCOUNTER FOR EXAMINATION FOLLOWING MOTOR VEHICLE COLLISION (MVC): ICD-10-CM

## 2025-03-24 DIAGNOSIS — M79.604 RIGHT LEG PAIN: Primary | ICD-10-CM

## 2025-03-24 PROCEDURE — 73502 X-RAY EXAM HIP UNI 2-3 VIEWS: CPT

## 2025-03-24 PROCEDURE — 73552 X-RAY EXAM OF FEMUR 2/>: CPT

## 2025-03-24 PROCEDURE — 73590 X-RAY EXAM OF LOWER LEG: CPT

## 2025-03-24 PROCEDURE — 10004651 HB RX, NO CHARGE ITEM: Performed by: STUDENT IN AN ORGANIZED HEALTH CARE EDUCATION/TRAINING PROGRAM

## 2025-03-24 PROCEDURE — 72192 CT PELVIS W/O DYE: CPT

## 2025-03-24 PROCEDURE — 99284 EMERGENCY DEPT VISIT MOD MDM: CPT

## 2025-03-24 PROCEDURE — 10002803 HB RX 637: Performed by: PHYSICIAN ASSISTANT

## 2025-03-24 PROCEDURE — 10002803 HB RX 637: Performed by: STUDENT IN AN ORGANIZED HEALTH CARE EDUCATION/TRAINING PROGRAM

## 2025-03-24 RX ORDER — IBUPROFEN 600 MG/1
600 TABLET, FILM COATED ORAL EVERY 6 HOURS PRN
Qty: 30 TABLET | Refills: 0 | Status: SHIPPED | OUTPATIENT
Start: 2025-03-24

## 2025-03-24 RX ORDER — IBUPROFEN 600 MG/1
600 TABLET, FILM COATED ORAL ONCE
Status: COMPLETED | OUTPATIENT
Start: 2025-03-24 | End: 2025-03-24

## 2025-03-24 RX ORDER — HYDROCODONE BITARTRATE AND ACETAMINOPHEN 5; 325 MG/1; MG/1
1 TABLET ORAL ONCE
Status: COMPLETED | OUTPATIENT
Start: 2025-03-24 | End: 2025-03-24

## 2025-03-24 RX ORDER — ACETAMINOPHEN 500 MG
1000 TABLET ORAL ONCE
Status: COMPLETED | OUTPATIENT
Start: 2025-03-24 | End: 2025-03-24

## 2025-03-24 RX ADMIN — ACETAMINOPHEN 1000 MG: 500 TABLET ORAL at 19:56

## 2025-03-24 RX ADMIN — HYDROCODONE BITARTRATE AND ACETAMINOPHEN 1 TABLET: 5; 325 TABLET ORAL at 22:14

## 2025-03-24 RX ADMIN — IBUPROFEN 600 MG: 600 TABLET, FILM COATED ORAL at 19:56

## 2025-03-24 ASSESSMENT — PAIN SCALES - GENERAL: PAINLEVEL_OUTOF10: 7

## (undated) NOTE — ED AVS SNAPSHOT
Dyke Gaucher   MRN: [id number]    Department:  [facility] Emergency Department   Date of Visit:  2/3/2020           Disclosure     Insurance plans vary and the physician(s) referred by the ER may not be covered by your plan.  Please contact CARE PHYSICIAN AT ONCE OR RETURN IMMEDIATELY TO THE EMERGENCY DEPARTMENT. If you have been prescribed any medication(s), please fill your prescription right away and begin taking the medication(s) as directed.   If you believe that any of the medications

## (undated) NOTE — LETTER
VACCINE ADMINISTRATION RECORD  PARENT / GUARDIAN APPROVAL  Date: 8/3/2021  Vaccine administered to:  Courtney Blood     : 1990    MRN: [id number]    A copy of the appropriate Centers for Disease Control and Prevention Vaccine Information stateme

## (undated) NOTE — Clinical Note
Continue care with Dr. Deisy Infante  Weekly NST  Monthly growth ultrasound and BPP  Delivery is advised at 38 weeks for uncomplicated dichorionic twins  I suggest referral to physical therapy for walk assist device evaluation

## (undated) NOTE — LETTER
7/19/2021              [address] [facility]         Dear Carolyn Martini records indicate that the 3 hour glucose test ordered for you by Carlton Friedman MD,  has not been done.   Please pro

## (undated) NOTE — LETTER
Date & Time: 2/3/2020, 9:39 PM  Patient: Dread Savage  Encounter Provider(s):    Gissell RAMOS       To Whom It May Concern:    Dread Savage was seen and treated in our department on 2/3/2020.     You may return to work after you have been

## (undated) NOTE — LETTER
VACCINE ADMINISTRATION RECORD  PARENT / GUARDIAN APPROVAL  Date: 2021  Vaccine administered to:  Tito Hutton     : 1990    MRN: [id number]    A copy of the appropriate Centers for Disease Control and Prevention Vaccine Information statem